# Patient Record
Sex: FEMALE | Race: WHITE | Employment: OTHER | URBAN - METROPOLITAN AREA
[De-identification: names, ages, dates, MRNs, and addresses within clinical notes are randomized per-mention and may not be internally consistent; named-entity substitution may affect disease eponyms.]

---

## 2018-02-26 ENCOUNTER — PAT TELEPHONE (OUTPATIENT)
Dept: PREADMISSION TESTING | Age: 73
End: 2018-02-26

## 2018-02-26 VITALS — BODY MASS INDEX: 27.47 KG/M2 | HEIGHT: 67 IN | WEIGHT: 175 LBS

## 2018-03-08 ENCOUNTER — HOSPITAL ENCOUNTER (OUTPATIENT)
Dept: SURGERY | Age: 73
Discharge: HOME | End: 2018-03-08
Attending: PODIATRIST | Admitting: PODIATRIST

## 2018-03-08 VITALS
TEMPERATURE: 97 F | OXYGEN SATURATION: 94 % | SYSTOLIC BLOOD PRESSURE: 114 MMHG | BODY MASS INDEX: 27.47 KG/M2 | WEIGHT: 175 LBS | DIASTOLIC BLOOD PRESSURE: 78 MMHG | RESPIRATION RATE: 16 BRPM | HEART RATE: 70 BPM | HEIGHT: 67 IN

## 2018-03-08 DIAGNOSIS — M21.612 BUNION, LEFT FOOT: Primary | ICD-10-CM

## 2018-03-08 RX ORDER — HYDROMORPHONE HCL 110MG/55ML
0.25 PATIENT CONTROLLED ANALGESIA SYRINGE INTRAVENOUS EVERY 5 MIN PRN
Status: DISCONTINUED | OUTPATIENT
Start: 2018-03-08 | End: 2018-03-09 | Stop reason: HOSPADM

## 2018-03-08 RX ORDER — PROMETHAZINE HYDROCHLORIDE 25 MG/1
25 TABLET ORAL EVERY 6 HOURS PRN
Qty: 30 TABLET | Refills: 2 | Status: SHIPPED | OUTPATIENT
Start: 2018-03-08 | End: 2019-03-08

## 2018-03-08 RX ORDER — SODIUM CHLORIDE, SODIUM LACTATE, POTASSIUM CHLORIDE, CALCIUM CHLORIDE 600; 310; 30; 20 MG/100ML; MG/100ML; MG/100ML; MG/100ML
INJECTION, SOLUTION INTRAVENOUS CONTINUOUS
Status: DISCONTINUED | OUTPATIENT
Start: 2018-03-08 | End: 2018-03-09 | Stop reason: HOSPADM

## 2018-03-08 RX ORDER — DIPHENHYDRAMINE HYDROCHLORIDE 50 MG/ML
12.5 INJECTION INTRAMUSCULAR; INTRAVENOUS
Status: ACTIVE | OUTPATIENT
Start: 2018-03-08 | End: 2018-03-08

## 2018-03-08 RX ORDER — OXYCODONE AND ACETAMINOPHEN 7.5; 325 MG/1; MG/1
1 TABLET ORAL EVERY 4 HOURS PRN
Qty: 40 TABLET | Refills: 0 | Status: SHIPPED | OUTPATIENT
Start: 2018-03-08 | End: 2018-03-15

## 2018-03-08 RX ORDER — LIDOCAINE HYDROCHLORIDE 10 MG/ML
1 INJECTION, SOLUTION EPIDURAL; INFILTRATION; INTRACAUDAL; PERINEURAL
Status: COMPLETED | OUTPATIENT
Start: 2018-03-08 | End: 2018-03-08

## 2018-03-08 RX ORDER — ONDANSETRON 2 MG/ML
4 INJECTION INTRAMUSCULAR; INTRAVENOUS
Status: ACTIVE | OUTPATIENT
Start: 2018-03-08 | End: 2018-03-08

## 2018-03-08 RX ORDER — SODIUM CHLORIDE 0.9 % (FLUSH) 0.9 %
10 SYRINGE (ML) INJECTION PRN
Status: DISCONTINUED | OUTPATIENT
Start: 2018-03-08 | End: 2018-03-09 | Stop reason: HOSPADM

## 2018-03-08 RX ORDER — MEPERIDINE HYDROCHLORIDE 25 MG/ML
12.5 INJECTION INTRAMUSCULAR; INTRAVENOUS; SUBCUTANEOUS EVERY 5 MIN PRN
Status: DISCONTINUED | OUTPATIENT
Start: 2018-03-08 | End: 2018-03-09 | Stop reason: HOSPADM

## 2018-03-08 RX ORDER — HYDROMORPHONE HCL 110MG/55ML
0.5 PATIENT CONTROLLED ANALGESIA SYRINGE INTRAVENOUS EVERY 5 MIN PRN
Status: COMPLETED | OUTPATIENT
Start: 2018-03-08 | End: 2018-03-08

## 2018-03-08 RX ORDER — PROMETHAZINE HYDROCHLORIDE 25 MG/ML
6.25 INJECTION, SOLUTION INTRAMUSCULAR; INTRAVENOUS
Status: ACTIVE | OUTPATIENT
Start: 2018-03-08 | End: 2018-03-08

## 2018-03-08 RX ORDER — HYDRALAZINE HYDROCHLORIDE 20 MG/ML
5 INJECTION INTRAMUSCULAR; INTRAVENOUS EVERY 10 MIN PRN
Status: DISCONTINUED | OUTPATIENT
Start: 2018-03-08 | End: 2018-03-09 | Stop reason: HOSPADM

## 2018-03-08 RX ORDER — ACETAMINOPHEN 10 MG/ML
1000 INJECTION, SOLUTION INTRAVENOUS ONCE
Status: COMPLETED | OUTPATIENT
Start: 2018-03-08 | End: 2018-03-08

## 2018-03-08 RX ORDER — OXYCODONE HYDROCHLORIDE AND ACETAMINOPHEN 5; 325 MG/1; MG/1
1 TABLET ORAL PRN
Status: COMPLETED | OUTPATIENT
Start: 2018-03-08 | End: 2018-03-08

## 2018-03-08 RX ORDER — OXYCODONE HYDROCHLORIDE AND ACETAMINOPHEN 5; 325 MG/1; MG/1
2 TABLET ORAL PRN
Status: COMPLETED | OUTPATIENT
Start: 2018-03-08 | End: 2018-03-08

## 2018-03-08 RX ORDER — MORPHINE SULFATE 10 MG/ML
2 INJECTION, SOLUTION INTRAMUSCULAR; INTRAVENOUS EVERY 5 MIN PRN
Status: DISCONTINUED | OUTPATIENT
Start: 2018-03-08 | End: 2018-03-09 | Stop reason: HOSPADM

## 2018-03-08 RX ORDER — MORPHINE SULFATE 10 MG/ML
1 INJECTION, SOLUTION INTRAMUSCULAR; INTRAVENOUS EVERY 5 MIN PRN
Status: DISCONTINUED | OUTPATIENT
Start: 2018-03-08 | End: 2018-03-09 | Stop reason: HOSPADM

## 2018-03-08 RX ORDER — LABETALOL HYDROCHLORIDE 5 MG/ML
5 INJECTION, SOLUTION INTRAVENOUS EVERY 10 MIN PRN
Status: DISCONTINUED | OUTPATIENT
Start: 2018-03-08 | End: 2018-03-09 | Stop reason: HOSPADM

## 2018-03-08 RX ORDER — 0.9 % SODIUM CHLORIDE 0.9 %
10 VIAL (ML) INJECTION EVERY 12 HOURS SCHEDULED
Status: DISCONTINUED | OUTPATIENT
Start: 2018-03-08 | End: 2018-03-09 | Stop reason: HOSPADM

## 2018-03-08 RX ADMIN — Medication 0.5 MG: at 10:59

## 2018-03-08 RX ADMIN — Medication 0.5 MG: at 11:23

## 2018-03-08 RX ADMIN — LIDOCAINE HYDROCHLORIDE 1 ML: 10 INJECTION, SOLUTION EPIDURAL; INFILTRATION; INTRACAUDAL; PERINEURAL at 07:47

## 2018-03-08 RX ADMIN — OXYCODONE HYDROCHLORIDE AND ACETAMINOPHEN 1 TABLET: 5; 325 TABLET ORAL at 12:03

## 2018-03-08 RX ADMIN — ACETAMINOPHEN 1000 MG: 10 INJECTION, SOLUTION INTRAVENOUS at 07:54

## 2018-03-08 RX ADMIN — SODIUM CHLORIDE, SODIUM LACTATE, POTASSIUM CHLORIDE, CALCIUM CHLORIDE: 600; 310; 30; 20 INJECTION, SOLUTION INTRAVENOUS at 07:47

## 2018-03-08 RX ADMIN — Medication 0.5 MG: at 11:16

## 2018-03-08 RX ADMIN — Medication 0.5 MG: at 11:09

## 2018-03-08 ASSESSMENT — ACTIVITIES OF DAILY LIVING (ADL): EFFECT OF PAIN ON DAILY ACTIVITIES: STANDING

## 2018-03-08 ASSESSMENT — PAIN DESCRIPTION - PROGRESSION: CLINICAL_PROGRESSION: NOT CHANGED

## 2018-03-08 ASSESSMENT — PAIN SCALES - GENERAL
PAINLEVEL_OUTOF10: 8
PAINLEVEL_OUTOF10: 4
PAINLEVEL_OUTOF10: 8
PAINLEVEL_OUTOF10: 4
PAINLEVEL_OUTOF10: 4
PAINLEVEL_OUTOF10: 7
PAINLEVEL_OUTOF10: 4
PAINLEVEL_OUTOF10: 8

## 2018-03-08 ASSESSMENT — PAIN DESCRIPTION - DESCRIPTORS: DESCRIPTORS: ACHING

## 2018-03-08 ASSESSMENT — PAIN DESCRIPTION - PAIN TYPE: TYPE: SURGICAL PAIN

## 2018-03-08 ASSESSMENT — PAIN - FUNCTIONAL ASSESSMENT: PAIN_FUNCTIONAL_ASSESSMENT: 0-10

## 2018-03-08 NOTE — ANESTHESIA PRE-OP
Department of Anesthesiology  Preprocedure Note       Name:  Keyanna Bae   Age:  72 y.o.  :  1945                                          MRN:  [id number]         Date:  3/8/2018      Surgeon:    Procedure:    Medications prior to admission:   Prior to Admission medications    Medication Sig Start Date End Date Taking? Authorizing Provider   gabapentin (NEURONTIN) 300 MG capsule Take 300 mg by mouth 2 times daily    Yes Historical Provider, MD   doxepin (SINEQUAN) 25 MG capsule Take 25 mg by mouth 10/9/13  Yes Historical Provider, MD   valsartan-hydrochlorothiazide (DIOVAN-HCT) 160-12.5 MG per tablet Take 1 tablet by mouth daily  10/13/14  Yes Historical Provider, MD   LORazepam (ATIVAN) 0.5 MG tablet Take 0.5 mg by mouth every 6 hours as needed for Anxiety   Yes Historical Provider, MD   Loratadine (CLARITIN PO) Take  by mouth. Yes Historical Provider, MD   aspirin 81 MG tablet Take 81 mg by mouth daily. Yes Historical Provider, MD   GABAPENTIN by Does not apply route. Yes Historical Provider, MD   Zolpidem Tartrate (AMBIEN PO) Take  by mouth. Yes Historical Provider, MD   traMADol (ULTRAM) 50 MG tablet Take 50 mg by mouth every 6 hours as needed. Yes Historical Provider, MD   NONFORMULARY every 30 days Indications: B 12 injection    Yes Historical Provider, MD       Current medications:    Current Outpatient Prescriptions   Medication Sig Dispense Refill    gabapentin (NEURONTIN) 300 MG capsule Take 300 mg by mouth 2 times daily       doxepin (SINEQUAN) 25 MG capsule Take 25 mg by mouth      valsartan-hydrochlorothiazide (DIOVAN-HCT) 160-12.5 MG per tablet Take 1 tablet by mouth daily       LORazepam (ATIVAN) 0.5 MG tablet Take 0.5 mg by mouth every 6 hours as needed for Anxiety      Loratadine (CLARITIN PO) Take  by mouth.  aspirin 81 MG tablet Take 81 mg by mouth daily.  GABAPENTIN by Does not apply route.  Zolpidem Tartrate (AMBIEN PO) Take  by mouth.       traMADol (ULTRAM) 50 MG tablet Take 50 mg by mouth every 6 hours as needed.       NONFORMULARY every 30 days Indications: B 12 injection        Current Facility-Administered Medications   Medication Dose Route Frequency Provider Last Rate Last Dose    lactated ringers infusion   Intravenous Continuous Christiane Daily,  mL/hr at 03/08/18 0747      sodium chloride (PF) 0.9 % injection 10 mL  10 mL Intravenous 2 times per day Christiane Daily, MD        sodium chloride flush 0.9 % injection 10 mL  10 mL Intravenous PRN Christiane Daily, MD        vancomycin 1000 mg IVPB in 250 mL D5W addavial  1,000 mg Intravenous Once Meryl [place] DPM        HYDROmorphone (DILAUDID) injection 0.25 mg  0.25 mg Intravenous Q5 Min PRN Christiane Daily, MD        HYDROmorphone (DILAUDID) injection 0.5 mg  0.5 mg Intravenous Q5 Min PRN Christiane Daily, MD        morphine (PF) injection 1 mg  1 mg Intravenous Q5 Min PRN Christiane Daily, MD        morphine (PF) injection 2 mg  2 mg Intravenous Q5 Min PRN Christiane Daily, MD        oxyCODONE-acetaminophen (PERCOCET) 5-325 MG per tablet 1 tablet  1 tablet Oral PRN Christiane Daily, MD        Or    oxyCODONE-acetaminophen (PERCOCET) 5-325 MG per tablet 2 tablet  2 tablet Oral PRN Christiane Daily, MD        diphenhydrAMINE (BENADRYL) injection 12.5 mg  12.5 mg Intravenous Once PRN Christiane Daily, MD        ondansetron [facility]) injection 4 mg  4 mg Intravenous Once PRN Christiane Daily, MD        promethazine [facility]) injection 6.25 mg  6.25 mg Intravenous Once PRN Christiane Daily, MD        labetalol (NORMODYNE;TRANDATE) injection 5 mg  5 mg Intravenous Q10 Min PRN Christiane Daily, MD        hydrALAZINE (APRESOLINE) injection 5 mg  5 mg Intravenous Q10 Min PRN Christiane Daily, MD        meperidine (DEMEROL) injection 12.5 mg  12.5 mg Intravenous Q5 Min PRN Christiane Daily, MD           Allergies:  No Known GI/Hepatic/Renal ROS  (+) GERD: well controlled,      (-) hiatal hernia       Endo/Other: Negative Endo/Other ROS   (+) : arthritis: OA., .                 Abdominal:           Vascular:                                     Pre-Operative Diagnosis: HALLUX ABDUCTO VALGUS LEFT FOOT,     72 y.o.   BMI:  Body mass index is 27.41 kg/m². Vitals:    03/08/18 0728   BP: 120/74   Pulse: 52   Resp: 16   Temp: 97.3 °F (36.3 °C)   TempSrc: Temporal   SpO2: 97%   Weight: 175 lb (79.4 kg)   Height: 5' 7\" (1.702 m)       No Known Allergies    Social History   Substance Use Topics    Smoking status: Never Smoker    Smokeless tobacco: Never Used    Alcohol use No       LABS:    CBC  Lab Results   Component Value Date/Time    WBC 3.9 (L) 01/14/2013 10:30 AM    HGB 10.8 (L) 01/14/2013 10:30 AM    HCT 33.0 (L) 01/14/2013 10:30 AM     01/14/2013 10:30 AM     RENAL  Lab Results   Component Value Date/Time     01/14/2013 10:30 AM    K 4.7 01/14/2013 10:30 AM     01/14/2013 10:30 AM    CO2 31 01/14/2013 10:30 AM    BUN 15 01/14/2013 10:30 AM    CREATININE 0.9 01/14/2013 10:30 AM    GLUCOSE 85 01/14/2013 10:30 AM     COAGS  Lab Results   Component Value Date/Time    PROTIME 11.7 12/17/2012 05:22 PM    INR 1.03 12/17/2012 05:22 PM        Anesthesia Plan      general     ASA 2     (I discussed with the patient the risks and benefits of PIV, general anesthesia, IV Narcotics, PACU. All questions were answered the patient agrees with the plan.)  Induction: intravenous. MIPS: Postoperative opioids intended and Prophylactic antiemetics administered. Anesthetic plan and risks discussed with patient. Plan discussed with CRNA.                   Meera Wheat MD   3/8/2018

## 2018-03-08 NOTE — ANESTHESIA POST-OP
Postoperative Anesthesia Note    Name:    Ann Charles  MRN:      [id number]    Patient Vitals for the past 12 hrs:   BP Temp Temp src Pulse Resp SpO2 Height Weight   03/08/18 1215 114/78 - - 70 16 94 % - -   03/08/18 1200 111/74 - - 69 14 92 % - -   03/08/18 1135 107/66 97 °F (36.1 °C) - 68 16 - - -   03/08/18 1124 109/66 - - 61 16 - - -   03/08/18 1109 117/71 - - 60 16 97 % - -   03/08/18 1053 124/77 - - 65 16 91 % - -   03/08/18 1048 125/74 - - 63 16 94 % - -   03/08/18 1043 134/84 - - 71 16 91 % - -   03/08/18 1038 125/82 97.3 °F (36.3 °C) Temporal 83 16 93 % - -   03/08/18 0728 120/74 97.3 °F (36.3 °C) Temporal 52 16 97 % 5' 7\" (1.702 m) 175 lb (79.4 kg)        LABS:    CBC  Lab Results   Component Value Date/Time    WBC 3.9 (L) 01/14/2013 10:30 AM    HGB 10.8 (L) 01/14/2013 10:30 AM    HCT 33.0 (L) 01/14/2013 10:30 AM     01/14/2013 10:30 AM     RENAL  Lab Results   Component Value Date/Time     01/14/2013 10:30 AM    K 4.7 01/14/2013 10:30 AM     01/14/2013 10:30 AM    CO2 31 01/14/2013 10:30 AM    BUN 15 01/14/2013 10:30 AM    CREATININE 0.9 01/14/2013 10:30 AM    GLUCOSE 85 01/14/2013 10:30 AM     COAGS  Lab Results   Component Value Date/Time    PROTIME 11.7 12/17/2012 05:22 PM    INR 1.03 12/17/2012 05:22 PM       Intake & Output: In: 3100 [I.V.:3100]  Out: -     Nausea & Vomiting:  No    Level of Consciousness:  Awake    Pain Assessment:  Adequate analgesia    Anesthesia Complications:  No apparent anesthetic complications    SUMMARY      Vital signs stable  OK to discharge from Stage I post anesthesia care.   Care transferred from Anesthesiology department on discharge from perioperative area

## 2018-03-08 NOTE — PROGRESS NOTES
Phase I (PACU)  1. Patient is identified using name and the date of birth. 2. The patient is free from signs and symptoms of chemical, electrical, laser, radiation, positioning, or transfer/transport injury. 3. The patient receives appropriate medication(s), safely administered during the Perioperative period. 4. The patient has wound/tissue perfusion consistent with or improved from baseline levels established preoperatively. 5. The patient is at or returning to normothermia at the conclusion of the immediate postoperative period. 6. The patient's fluid, electrolyte, and acid base balances are consistent with or improved from baseline levels established preoperatively. 7. The patient's pulmonary function is consistent with or improved from baseline levels established preoperatively. 8. The patient's cardiovascular status is consistent with or improved from baseline levels established preoperatively. 9. The patient/caregiver participates in decisions affecting his or her Perioperative plan of care. 10. The patient's care is consistent with the individualized Perioperative plan of care. 11. The patient's right to privacy is maintained. 12. The patient is the recipient of competent and ethical care within legal standards of practice. 13. The patient's value system, lifestyle, ethnicity, and culture are considered, respected, and incorporated in the Perioperative plan of care. 14. The patient demonstrates and/or reports adequate pain control throughout the the Perioperative period. 15. The patient's neurological status is consistent with or improved from baseline levels established preoperatively. 16. The patient/caregiver demonstrates knowledge of the expected responses to the operative or invasive procedure. 17. Patient/Caregiver has reduced anxiety. Interventions- Familiarize with environment and equipment.   18. Potential for fall the patient will move to fall risk upon sedation - through the
Phase II:  1. Patient is identified using name and the date of birth. 2. The patient is free from signs and symptoms of chemical, electrical, laser, radiation, positioning, or transfer/transport injury. 3. The patient receives appropriate medication(s), safely administered during the Perioperative period. 4. The patient has wound/tissue perfusion consistent with or improved from baseline levels established preoperatively. 5. The patient is at or returning to normothermia at the conclusion of the immediate postoperative period. 6. The patient's fluid, electrolyte, and acid base balances are consistent with or improved from baseline levels established preoperatively. 7. The patient's pulmonary function is consistent with or improved from baseline levels established preoperatively. 8. The patient's cardiovascular status is consistent with or improved from baseline levels established preoperatively. 9. The patient/caregiver demonstrates knowledge of nutritional management related to the operative or other invasive procedure. 10. The patient/caregiver demonstrates knowledge of medication, pain, and wound management. 11. The patient participates in the rehabilitation process as applicable. 12. The patient/caregiver participates in decisions affection his or her Perioperative plan of care. 13. The patient's care is consistent with the individualized Perioperative plan of care. 14. The patient's right to privacy is maintained. 15. The patient is the recipient of competent and ethical care within legal standards of practice. 16. The patient's value system, lifestyle, ethnicity, and culture are considered, respected, and incorporated in the Perioperative plan of care and understands special services available. 17. The patient demonstrates and/or reports adequate pain control throughout the the Perioperative period. 18.  The patient's neurological status is consistent with or improved from baseline levels
established preoperatively. 23. The patient/caregiver demonstrates knowledge of the expected responses to the operative or invasive procedure. 20. Patient/Caregiver has reduced anxiety. Interventions- Familiarize with environment and equipment. 21. Potential for fall the patient will move to fall risk upon sedation through the recovery phase. [place] to environment. Call light in reach. Instruct to call for assistance prior to getting up. Non skid footwear on. Bed wheels locked. Bed in lowest position. Side rails up times two.

## 2018-03-15 NOTE — OP NOTE
reduced. The lateral cortex  was left intact. This osteotomy was then fixated using a 10 mm staple. The hallux and the 1st ray were now noted to be in rectus position. The wound was then irrigated with copious amount of normal saline. The  periosteum and the MPJ capsule were reapproximated using 2-0 Vicryl sutures,  3-0 Vicryl sutures were used to reapproximate the subcutaneous tissue, 4-0  Vicryl sutures were used to reapproximate the skin edges in a continuous  subcuticular manner. Procedure #2: Arthroplasty of the 2nd digit, left foot. Attention was now  directed at the dorsal aspect of the 2nd digit over the PIPJ and the MPJ  area. Approximately 4-cm incision was placed over both of these joints. Careful sharp and dull dissection was carried down through the skin and  subcutaneous tissues. Next, a transverse incision was made over the PIPJ of  the 2nd digit reflecting the extensor tendon and the joint capsule. The head  of the proximal phalanx was then resected using the sagittal saw. The digit  was now noted to be relaxed at the PIPJ but was still noted to be dorsally  contracted at the MPJ. Sequential release was performed at the MPJ including  joint capsule and the extensor viera until the digit was noted to be sitting  in a rectus position. The digit was then fixated using a cross-tie toe implant. This wound was then  irrigated with copious amount of normal saline. The extensor tendon was  reapproximated using 3-0 Vicryl sutures, 3-0 Vicryl sutures were also used to  reapproximate the subcutaneous tissue, 4-0 Vicryl suture were used in a  continuous subcuticular manner to reapproximate the skin edges. Procedure #3: Arthroplasty of the 3rd digit, left foot. Attention was now  directed at the dorsal aspect of the 3rd digit over the PIPJ and the MPJ  area. Approximately 4-cm incision was placed over both of these joints.    Careful sharp and dull dissection was carried down through the skin a  continuous subcuticular manner to reapproximate the skin edges. Procedure #5: Arthroplasty of the 5th digit, left foot. Attention was now  directed at the dorsal aspect of the 5th digit over the PIPJ and the MPJ  area. Two semi-elliptical incisions were made over the digits. Careful sharp   and dull dissection was carried down through the skin and  subcutaneous tissues. Next, a transverse incision was made over the PIPJ of  the 5th digit reflecting the extensor tendon and the joint capsule. The head  of the proximal phalanx was then resected using the sagittal saw. The digit  was now noted to be relaxed at the [address] [place] [address]. This wound was then  irrigated with copious amount of normal saline. The extensor tendon was  reapproximated using 3-0 Vicryl sutures, 3-0 Vicryl sutures were also used to  reapproximate the subcutaneous tissue, 4-0 Vicryl suture were used in a  continuous subcuticular manner to reapproximate the skin edges. Procedure #6 : Weil osteotomy 2nd metatarsal left foot. Attention was then directed to the dorsal aspect of the left foot. Utilizing a #15 blade, a linear incision was made overlying the 2nd   metatarsophalangeal joint. This incision was made extending from the middle   phalanx proximally and across the proximal interphalangeal joint extending   along the dorsal aspect of the 2nd metatarsal. This incision was carried   down through superficial and deep structures to the level of the extensor   tendon. Utilizing a #15 blade extensor tenotomy was performed at the level   of the proximal interphalangeal joint and the extensor tendon was reflected   proximally, thereby exposing the underlying proximal phalanx and the 2nd   metatarsophalangeal joint. Utilizing a #15 blade a dorsal capsulotomy was   performed, as well as the medial and lateral collaterals were reflected   thereby exposing the underlying metatarsal head.     Procedure #7: The plantar plate was noted to be ruptured and was repaired using standard AO technique. At this time a metatarsal osteotomy was performed in the 2nd   metatarsal and the metatarsal head was shifted proximally and temporarily   fixated utilizing a 0.045 K-wire. Appropriate positioning of the metatarsal   head was obtained and utilizing a [employer] 2.0 mm twist-off screw the   metatarsal osteotomy was primary fixated and found to be in appropriate   position. The surgical site was then flushed with copious amounts of normal   sterile saline. At this point the joint capsule of the 2nd   metatarsophalangeal joint was repaired primarily utilizing a 3-0 Vicryl   suture, after which the tenotomy site was repaired primarily utilizing a 3-0   Vicryl suture. Deep and superficial structures were reapproximated utilizing   a 3-0 Vicryl suture, after which the skin edges were reapproximated utilizing   a 4-0 Vicryl suture in a running subcuticular suture fashion. Next, 30 mL of 0.5% Marcaine was injected into the operative sites for  postoperative anesthesia. Followed by application of compressive dressing  consisting of Adaptic, 4 x 4, Constantin. Followed by application of Webril,  posterior splint and Ace bandages so that the ankle could be held at 90  Degrees, by the time the dressing were applied the digits were noted to have CFTs less than 3 seconds. Patient tolerated anesthesia and procedure well and left the operating room  with vital signs stable and neurovascular status intact.

## 2018-11-27 ENCOUNTER — HOSPITAL ENCOUNTER (EMERGENCY)
Age: 73
Discharge: HOME | End: 2018-11-27
Attending: EMERGENCY MEDICINE
Payer: MEDICARE

## 2018-11-27 ENCOUNTER — APPOINTMENT (OUTPATIENT)
Dept: GENERAL RADIOLOGY | Age: 73
End: 2018-11-27
Payer: MEDICARE

## 2018-11-27 VITALS
HEIGHT: 67 IN | TEMPERATURE: 98.2 F | WEIGHT: 175 LBS | HEART RATE: 56 BPM | OXYGEN SATURATION: 96 % | SYSTOLIC BLOOD PRESSURE: 132 MMHG | DIASTOLIC BLOOD PRESSURE: 77 MMHG | BODY MASS INDEX: 27.47 KG/M2 | RESPIRATION RATE: 16 BRPM
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
## 2018-11-27 DIAGNOSIS — M25.561 ACUTE PAIN OF BOTH KNEES: ICD-10-CM

## 2018-11-27 DIAGNOSIS — M25.562 ACUTE PAIN OF BOTH KNEES: ICD-10-CM

## 2018-11-27 DIAGNOSIS — M79.642 LEFT HAND PAIN: ICD-10-CM

## 2018-11-27 PROCEDURE — 6370000000 HC RX 637 (ALT 250 FOR IP): Performed by: EMERGENCY MEDICINE

## 2018-11-27 PROCEDURE — 73130 X-RAY EXAM OF HAND: CPT

## 2018-11-27 PROCEDURE — 73560 X-RAY EXAM OF KNEE 1 OR 2: CPT

## 2018-11-27 PROCEDURE — 99284 EMERGENCY DEPT VISIT MOD MDM: CPT

## 2018-11-27 PROCEDURE — 72100 X-RAY EXAM L-S SPINE 2/3 VWS: CPT

## 2018-11-27 RX ORDER — NAPROXEN 500 MG/1
500 TABLET ORAL 2 TIMES DAILY PRN
Qty: 20 TABLET | Refills: 0 | Status: SHIPPED | OUTPATIENT
Start: 2018-11-27 | End: 2021-04-19 | Stop reason: ALTCHOICE

## 2018-11-27 RX ORDER — NAPROXEN 250 MG/1
500 TABLET ORAL ONCE
Status: COMPLETED | OUTPATIENT
Start: 2018-11-27 | End: 2018-11-27

## 2018-11-27 RX ADMIN — NAPROXEN 500 MG: 250 TABLET ORAL at 15:11

## 2018-11-27 ASSESSMENT — PAIN SCALES - GENERAL: PAINLEVEL_OUTOF10: 6

## 2019-06-05 LAB — ANTIBODY: NORMAL

## 2020-01-14 LAB — MAMMOGRAPHY, EXTERNAL: NORMAL

## 2020-11-24 ENCOUNTER — APPOINTMENT (OUTPATIENT)
Dept: CT IMAGING | Age: 75
End: 2020-11-24
Payer: MEDICARE

## 2020-11-24 ENCOUNTER — HOSPITAL ENCOUNTER (EMERGENCY)
Age: 75
Discharge: HOME | End: 2020-11-24
Payer: MEDICARE

## 2020-11-24 ENCOUNTER — APPOINTMENT (OUTPATIENT)
Dept: GENERAL RADIOLOGY | Age: 75
End: 2020-11-24
Payer: MEDICARE

## 2020-11-24 VITALS
DIASTOLIC BLOOD PRESSURE: 75 MMHG | HEART RATE: 55 BPM | WEIGHT: 175 LBS | RESPIRATION RATE: 16 BRPM | SYSTOLIC BLOOD PRESSURE: 132 MMHG | BODY MASS INDEX: 27.47 KG/M2 | TEMPERATURE: 97.9 F | OXYGEN SATURATION: 95 % | HEIGHT: 67 IN

## 2020-11-24 LAB
A/G RATIO: 1.5 (ref 1.1–2.2)
ALBUMIN SERPL-MCNC: 4.3 G/DL (ref 3.4–5)
ALP BLD-CCNC: 117 U/L (ref 40–129)
ALT SERPL-CCNC: 13 U/L (ref 10–40)
ANION GAP SERPL CALCULATED.3IONS-SCNC: 8 MMOL/L (ref 3–16)
AST SERPL-CCNC: 22 U/L (ref 15–37)
BASOPHILS ABSOLUTE: 0 K/UL (ref 0–0.2)
BASOPHILS RELATIVE PERCENT: 0.6 %
BILIRUB SERPL-MCNC: 0.5 MG/DL (ref 0–1)
BUN BLDV-MCNC: 15 MG/DL (ref 7–20)
C-REACTIVE PROTEIN: 2.1 MG/L (ref 0–5.1)
CALCIUM SERPL-MCNC: 8.8 MG/DL (ref 8.3–10.6)
CHLORIDE BLD-SCNC: 97 MMOL/L (ref 99–110)
CO2: 28 MMOL/L (ref 21–32)
CREAT SERPL-MCNC: 0.8 MG/DL (ref 0.6–1.2)
EOSINOPHILS ABSOLUTE: 0.1 K/UL (ref 0–0.6)
EOSINOPHILS RELATIVE PERCENT: 1.7 %
GFR AFRICAN AMERICAN: >60
GFR NON-AFRICAN AMERICAN: >60
GLOBULIN: 2.8 G/DL
GLUCOSE BLD-MCNC: 88 MG/DL (ref 70–99)
HCT VFR BLD CALC: 35.5 % (ref 36–48)
HEMOGLOBIN: 11.7 G/DL (ref 12–16)
LYMPHOCYTES ABSOLUTE: 1.3 K/UL (ref 1–5.1)
LYMPHOCYTES RELATIVE PERCENT: 17.5 %
MCH RBC QN AUTO: 27.8 PG (ref 26–34)
MCHC RBC AUTO-ENTMCNC: 32.8 G/DL (ref 31–36)
MCV RBC AUTO: 84.8 FL (ref 80–100)
MONOCYTES ABSOLUTE: 0.5 K/UL (ref 0–1.3)
MONOCYTES RELATIVE PERCENT: 7 %
NEUTROPHILS ABSOLUTE: 5.5 K/UL (ref 1.7–7.7)
NEUTROPHILS RELATIVE PERCENT: 73.2 %
PDW BLD-RTO: 14.7 % (ref 12.4–15.4)
PLATELET # BLD: 266 K/UL (ref 135–450)
PMV BLD AUTO: 7.1 FL (ref 5–10.5)
POTASSIUM REFLEX MAGNESIUM: 3.8 MMOL/L (ref 3.5–5.1)
RBC # BLD: 4.19 M/UL (ref 4–5.2)
SEDIMENTATION RATE, ERYTHROCYTE: 15 MM/HR (ref 0–30)
SODIUM BLD-SCNC: 133 MMOL/L (ref 136–145)
TOTAL PROTEIN: 7.1 G/DL (ref 6.4–8.2)
WBC # BLD: 7.5 K/UL (ref 4–11)

## 2020-11-24 PROCEDURE — 85652 RBC SED RATE AUTOMATED: CPT

## 2020-11-24 PROCEDURE — 86140 C-REACTIVE PROTEIN: CPT

## 2020-11-24 PROCEDURE — 99284 EMERGENCY DEPT VISIT MOD MDM: CPT

## 2020-11-24 PROCEDURE — 85025 COMPLETE CBC W/AUTO DIFF WBC: CPT

## 2020-11-24 PROCEDURE — 6370000000 HC RX 637 (ALT 250 FOR IP): Performed by: PHYSICIAN ASSISTANT

## 2020-11-24 PROCEDURE — 73502 X-RAY EXAM HIP UNI 2-3 VIEWS: CPT

## 2020-11-24 PROCEDURE — 80053 COMPREHEN METABOLIC PANEL: CPT

## 2020-11-24 PROCEDURE — 73700 CT LOWER EXTREMITY W/O DYE: CPT

## 2020-11-24 RX ORDER — HYDROCODONE BITARTRATE AND ACETAMINOPHEN 5; 325 MG/1; MG/1
1 TABLET ORAL ONCE
Status: DISCONTINUED | OUTPATIENT
Start: 2020-11-24 | End: 2020-11-24

## 2020-11-24 RX ORDER — ACETAMINOPHEN 500 MG
500 TABLET ORAL ONCE
Status: COMPLETED | OUTPATIENT
Start: 2020-11-24 | End: 2020-11-24

## 2020-11-24 RX ADMIN — ACETAMINOPHEN 500 MG: 500 TABLET ORAL at 12:29

## 2020-11-24 ASSESSMENT — ENCOUNTER SYMPTOMS
RESPIRATORY NEGATIVE: 1
GASTROINTESTINAL NEGATIVE: 1

## 2020-11-24 ASSESSMENT — PAIN DESCRIPTION - DESCRIPTORS: DESCRIPTORS: BURNING;CONSTANT;STABBING

## 2020-11-24 ASSESSMENT — PAIN SCALES - GENERAL: PAINLEVEL_OUTOF10: 10

## 2020-11-24 ASSESSMENT — PAIN DESCRIPTION - ORIENTATION: ORIENTATION: RIGHT

## 2020-11-24 ASSESSMENT — PAIN DESCRIPTION - PAIN TYPE: TYPE: ACUTE PAIN

## 2020-11-24 ASSESSMENT — PAIN DESCRIPTION - LOCATION: LOCATION: HIP

## 2020-11-24 NOTE — ED PROVIDER NOTES
[address] ED  EMERGENCY DEPARTMENT ENCOUNTER        Pt Name: Lisa Carolina  MRN: [id number]  [place] 1945  Date of evaluation: 11/24/2020  Provider: Rosaline Osler, PA-C  PCP: Santino Bobo MD    MEGHANN. I have evaluated this patient. My supervising physician was available for consultation. CHIEF COMPLAINT       Chief Complaint   Patient presents with    Hip Pain     right after fall Friday night       HISTORY OF PRESENT ILLNESS   (Location, Timing/Onset, Context/Setting, Quality, Duration, Modifying Factors, Severity, Associated Signs and Symptoms)  Note limiting factors. Lisa Carolina is a 70 y.o. female with a past medical history of hypertension, brain aneurysm, arthritis brought in today by private vehicle for complaints of right hip pain. Patient states she tripped on a sidewalk 3 days ago and landed on her right hip. Denies hitting her head or loss of consciousness. Onset occurred 3 days ago. Duration symptoms have been persistent since onset. Context includes a mechanical fall. She has been able to bear weight since the fall. She has been taking tramadol as well as muscle relaxants at home with little to no relief of the pain. Patient did drive in today. She currently rates the pain a 10 out of 10. Denies any numbness or tingling to lower extremities. Denies any bowel or bladder and incontinence. Denies fevers chills nausea vomiting or abdominal pain. Denies any urinary complaints. No aggravating or alleviating complaints. She has not tried anything else at home for symptomatic relief. Nothing seems to make symptoms better or worse at this time. Nursing Notes were all reviewed and agreed with or any disagreements were addressed in the HPI. REVIEW OF SYSTEMS    (2-9 systems for level 4, 10 or more for level 5)     Review of Systems   Constitutional: Negative. Respiratory: Negative. Cardiovascular: Negative. Gastrointestinal: Negative. Genitourinary: Negative. Musculoskeletal: Positive for arthralgias. Skin: Negative. Neurological: Negative. Positives and Pertinent negatives as per HPI. Except as noted above in the ROS, all other systems were reviewed and negative. PAST MEDICAL HISTORY     Past Medical History:   Diagnosis Date    Arthritis     Brain aneurysm     Hypertension     Neuropathy     PONV (postoperative nausea and vomiting)     after colonoscopy         SURGICAL HISTORY     Past Surgical History:   Procedure Laterality Date    BRAIN ANEURYSM SURGERY      BUNIONECTOMY Left 03/08/2018    COLONOSCOPY  2013    JOINT REPLACEMENT      UPPER GASTROINTESTINAL ENDOSCOPY  28 Dec 2015    polyps bx         CURRENTMEDICATIONS       Current Discharge Medication List      CONTINUE these medications which have NOT CHANGED    Details   LOSARTAN POTASSIUM PO Take by mouth      naproxen (NAPROXEN) 500 MG EC tablet Take 1 tablet by mouth 2 times daily as needed for Pain  Qty: 20 tablet, Refills: 0      gabapentin (NEURONTIN) 300 MG capsule Take 300 mg by mouth 2 times daily       doxepin (SINEQUAN) 25 MG capsule Take 25 mg by mouth      LORazepam (ATIVAN) 0.5 MG tablet Take 0.5 mg by mouth every 6 hours as needed for Anxiety      Loratadine (CLARITIN PO) Take  by mouth. aspirin 81 MG tablet Take 81 mg by mouth daily. GABAPENTIN by Does not apply route. Zolpidem Tartrate (AMBIEN PO) Take  by mouth. traMADol (ULTRAM) 50 MG tablet Take 50 mg by mouth every 6 hours as needed. NONFORMULARY every 30 days Indications: B 12 injection                ALLERGIES     Patient has no known allergies. FAMILYHISTORY     History reviewed. No pertinent family history.        SOCIAL HISTORY       Social History     Tobacco Use    Smoking status: Never Smoker    Smokeless tobacco: Never Used   Substance Use Topics    Alcohol use: No    Drug use: No       SCREENINGS    Brenda Coma Scale  Eye Opening: Pronounced osteolysis and periosteal reaction of both the right and left   pubic symphysis. Small associated joint effusion. Differential   considerations include osteomyelitis as well as pronounced osteitis pubis. Correlate clinically for signs of infection as imaging appearance of osteitis   pubis and osteomyelitis can overlap. The aggressive appearance is suspicious   for infection in the appropriate clinical setting. Chronic appearing insufficiency fracture of the left sacrum. No acute fracture of the right hip identified. XR HIP RIGHT (2-3 VIEWS)   Final Result   No acute abnormality of the right hip. Bony resorption about the symphysis   pubis presumed to be degenerative in nature. Osteomyelitis could cause a   similar appearance. No other significant abnormality. RECOMMENDATION:   Correlation with any symptoms referable to the symphysis pubis. If the   patient is significantly symptomatic, CT or MR scanning would be recommended   for further evaluation. Xr Hip Right (2-3 Views)    Result Date: 11/24/2020  EXAMINATION: TWO XRAY VIEWS OF THE RIGHT HIP 11/24/2020 12:54 pm COMPARISON: Lumbar spine films 11/27/2018. HISTORY: ORDERING SYSTEM PROVIDED HISTORY: fall right hip pain TECHNOLOGIST PROVIDED HISTORY: Reason for exam:->fall right hip pain Reason for Exam: rt hip pain Acuity: Chronic Type of Exam: Initial Mechanism of Injury: tripped over curb in parking lot Relevant Medical/Surgical History: hx of left hip pain/abnormal mri per pt FINDINGS: No acute fracture or dislocation is seen in the right hip. No significant degenerative change present in either hip. Significant degenerative changes and bony resorption present at the symphysis pubis. These findings have changed since 11/27/2018. Soft tissues appear normal.     No acute abnormality of the right hip. Bony resorption about the symphysis pubis presumed to be degenerative in nature.   Osteomyelitis could cause a similar appearance. No other significant abnormality. RECOMMENDATION: Correlation with any symptoms referable to the symphysis pubis. If the patient is significantly symptomatic, CT or MR scanning would be recommended for further evaluation. Ct Hip Right Wo Contrast    Result Date: 11/24/2020  EXAMINATION: CT OF THE RIGHT HIP WITHOUT CONTRAST 11/24/2020 1:47 pm TECHNIQUE: CT of the right hip was performed without the administration of intravenous contrast.  Multiplanar reformatted images are provided for review. Dose modulation, iterative reconstruction, and/or weight based adjustment of the mA/kV was utilized to reduce the radiation dose to as low as reasonably achievable. COMPARISON: None. HISTORY ORDERING SYSTEM PROVIDED HISTORY: bony reabsorption about the symphysis pubis TECHNOLOGIST PROVIDED HISTORY: Reason for exam:->bony reabsorption about the symphysis pubis Reason for Exam: right after fall Friday night Acuity: Chronic Type of Exam: Initial FINDINGS: Bones: Bony resorption and associated fragmentation of both the right and left pubic symphysis. Vertically oriented sclerosis and cortical irregularity of the left sacral body most compatible with sacral insufficiency fracture which is favored to be chronic. The osseous structures are otherwise intact with no acute fracture of the right hip identified. Soft Tissue:  Visualized intrapelvic structures demonstrate no acute abnormality. At least mild sigmoid diverticulosis. Imaged bowel loops are normal in caliber with no evidence for obstruction. Subcutaneous tissues appear grossly unremarkable. Joint:  Osteolysis and periosteal reaction involving the pubic symphysis with small associated joint effusion. Anatomic alignment of the hips. The joint spaces of the hips appear grossly preserved. Mild-to-moderate degenerative changes of the imaged lower lumbar spine.      Pronounced osteolysis and periosteal reaction of both the right and left pubic symphysis. Small associated joint effusion. Differential considerations include osteomyelitis as well as pronounced osteitis pubis. Correlate clinically for signs of infection as imaging appearance of osteitis pubis and osteomyelitis can overlap. The aggressive appearance is suspicious for infection in the appropriate clinical setting. Chronic appearing insufficiency fracture of the left sacrum. No acute fracture of the right hip identified. PROCEDURES   Unless otherwise noted below, none     Procedures    CRITICAL CARE TIME   N/A    CONSULTS:  IP CONSULT TO ORTHOPEDIC SURGERY      EMERGENCY DEPARTMENT COURSE and DIFFERENTIAL DIAGNOSIS/MDM:   Vitals:    Vitals:    11/24/20 1236 11/24/20 1307 11/24/20 1337 11/24/20 1654   BP: 139/69 125/76 (!) 124/52 132/75   Pulse:   56 55   Resp:   16 16   Temp:       TempSrc:       SpO2: 93% 93% 93% 95%   Weight:       Height:           Patient was given the following medications:  Medications   acetaminophen (TYLENOL) tablet 500 mg (500 mg Oral Given 11/24/20 1229)           Patient brought in today by private vehicle for complaints of right hip pain after mechanical fall. On exam patient is alert oriented afebrile breathing on room air satting at 95%. Nontoxic in appearance no acute respiratory distress. Old labs and records reviewed at this time. CBC reveals no acute leukocytosis. Hemoglobin of 11.7. No acute electrolyte abnormalities. Kidney function unremarkable. X-ray of the right hip reveals no acute abnormality. Bony reabsorption about the symphysis pubis presumed to be degenerative in nature. Osteomyelitis could cause a similar appearance. No other significant abnormality. Correlate with any symptoms referable to the symphysis pubis. If the patient is symptomatic a CT or MR would be recommended. CT of the right hip reveals pronounced osteolysis and periosteal reaction of both the right and left pubic symphysis.   Small associated joint effusion. Differential includes osteomyelitis as well as pronounced osteitis pubis. Correlate clinically for signs of infection as imaging appearance of osteitis pubis and osteomyelitis can overlap. The aggressive appearance is suspicious for infection in the appropriate clinical setting. Chronic appearing insufficiency fracture of the left sacrum. No acute fracture of the right hip is identified. Given finding on CT I did consult orthopedics and spoke to Dr. Thomas Finch who did not feel as though this was infectious in nature. ESR was unremarkable. I did discuss findings with the patient. CRP was pending. Patient does follow with orthopedics at [place]. Patient already has tramadol and muscle relaxants at home. I did give patient follow-up for orthopedics however patient would like to follow-up with her [place] orthopedics as she is already established. I advised her to follow-up in the next 1 to 2 days. Patient instructed to return immediately to the ED if she experience any new or worsening symptoms including but not limited to increased pain, numbness or tingling fevers chills nausea vomiting bowel or bladder incontinence or any new or worsening symptoms. She verbalized understanding this plan was comfortable and stable at time of discharge. Patient able to bear weight at time of discharge. I did feel comfortable sending this patient home with close follow-up instructions and strict return precautions. Patient stable at time of discharge. FINAL IMPRESSION      1.  Right hip pain          DISPOSITION/PLAN   DISPOSITION Decision To Discharge 11/24/2020 04:17:48 PM      PATIENT REFERREDTO:  Keith Sinclair MD  [address]  [phone]    Schedule an appointment as soon as possible for a visit in 1 day      [facility] [employer] [facility] ED  [address] [facility]  Schedule an appointment as soon as possible for a visit   As needed, If symptoms worsen      DISCHARGE MEDICATIONS:  Current Discharge Medication List          DISCONTINUED MEDICATIONS:  Current Discharge Medication List                 (Please note that portions of this note were completed with a voice recognition program.  Efforts were made to edit the dictations but occasionally words are mis-transcribed.)    Rosaline Osler, PA-C (electronically signed)            Rosaline Osler, PA-C  11/24/20 3459

## 2020-11-30 ENCOUNTER — OFFICE VISIT (OUTPATIENT)
Dept: ORTHOPEDIC SURGERY | Age: 75
End: 2020-11-30
Payer: MEDICARE

## 2020-11-30 VITALS — BODY MASS INDEX: 27.47 KG/M2 | HEIGHT: 67 IN | WEIGHT: 175 LBS

## 2020-11-30 PROBLEM — M53.3 SACROILIAC JOINT PAIN: Status: ACTIVE | Noted: 2020-11-30

## 2020-11-30 PROCEDURE — 1090F PRES/ABSN URINE INCON ASSESS: CPT | Performed by: ORTHOPAEDIC SURGERY

## 2020-11-30 PROCEDURE — 1123F ACP DISCUSS/DSCN MKR DOCD: CPT | Performed by: ORTHOPAEDIC SURGERY

## 2020-11-30 PROCEDURE — G8427 DOCREV CUR MEDS BY ELIG CLIN: HCPCS | Performed by: ORTHOPAEDIC SURGERY

## 2020-11-30 PROCEDURE — 3017F COLORECTAL CA SCREEN DOC REV: CPT | Performed by: ORTHOPAEDIC SURGERY

## 2020-11-30 PROCEDURE — 1036F TOBACCO NON-USER: CPT | Performed by: ORTHOPAEDIC SURGERY

## 2020-11-30 PROCEDURE — G8484 FLU IMMUNIZE NO ADMIN: HCPCS | Performed by: ORTHOPAEDIC SURGERY

## 2020-11-30 PROCEDURE — G8417 CALC BMI ABV UP PARAM F/U: HCPCS | Performed by: ORTHOPAEDIC SURGERY

## 2020-11-30 PROCEDURE — G8400 PT W/DXA NO RESULTS DOC: HCPCS | Performed by: ORTHOPAEDIC SURGERY

## 2020-11-30 PROCEDURE — 4040F PNEUMOC VAC/ADMIN/RCVD: CPT | Performed by: ORTHOPAEDIC SURGERY

## 2020-11-30 PROCEDURE — 99203 OFFICE O/P NEW LOW 30 MIN: CPT | Performed by: ORTHOPAEDIC SURGERY

## 2020-11-30 NOTE — PROGRESS NOTES
Chief Complaint    New Patient (RIGHT HIP - PSIS down posterior glute)      History of Present Illness: Author  is a 70 y.o. female for evaluation of chief complaint right lower back and posterior buttock pain. This started approximately 10 days ago when she fell onto her right side. She is initially evaluated in the emergency department where there is concern for potential for septic joint or hip fracture. Inflammatory labs were negative as were radiograph and CT. 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 The symptoms do fit similar to that. .  Symptoms are worse with activity and better with rest. Patient endorses right low back and buttock pain as above but denies radicular symptoms down into her leg. Treatment to date includes: Anti-inflammatories, antispasmodics, use of assistive device in the form of a cane. Medical History:  Patient's medications, allergies, past medical, surgical, social and family histories were reviewed and updated as appropriate. Review of Systems:  Relevant review of systems reviewed and available in the patient's chart. They are negative or noncontributory except as per HPI. Vital Signs: There were no vitals filed for this visit. General: well-developed, well-nourished; adequately groomed  CV: RR  RESP: Respirations unlabored on RA  Skin: No rashes or ulcerations appreciated  Psych: appropriate mood and affect      Musculoskeletal:    Extremity: Right lower extremity    Inspection: No swelling or ecchymosis about the leg distally. Palpation: No tenderness palpation about the greater trochanter. Range of Motion: Full hip range of motion. Stability: Pelvis is stable    Strength: 5 out of 5 hip flexion, knee extension, knee flexion, dorsiflexion plantarflexion inversion eversion about the right ankle.     Special Tests: Positive KALIN test for posterior pain, negative straight leg test.    Gait: Antalgic    Pulse/perfusion: 2+ dorsalis pedis pulse, foot warm and well perfused    Neurological:    Sensation: Sensation intact to light touch throughout     Reflexes: Deep tendon reflexes intact     Functional: the patient has good coordination and balance     Radiology:     X-rays obtained and reviewed in office:  Views AP pelvis and right hip  Impression there are degenerative changes to bilateral SI joints and demonstrated a history of prior fracture and degenerative changes about the symphysis pubis. No evidence of hip fracture. 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 Her current symptoms of the same so I suspect that that is the case although without an MRI would not have clear approved. This could also be from her degenerative arthritis of her lumbar spine. Impression:  Encounter Diagnoses   Name Primary?  Right hip pain Yes    Sacroiliac joint pain        Office Procedures:  Orders Placed This Encounter   Procedures    XR HIP RIGHT (2-3 VIEWS)     Standing Status:   Future     Number of Occurrences:   1     Standing Expiration Date:   11/30/2021   Tita Ohara MD, Spine Surgery, [facility]     Referral Priority:   Routine     Referral Type:   Eval and Treat     Referral Reason:   Specialty Services Required     Referred to Provider:   Laura Moreno MD     Requested Specialty:   Pain Management     Number of Visits Requested:   1       Treatment Plan:      I had a nice discussion with the patient today. I would not get another MRI as I do not think that this would change my management. She does think she got a DEXA scan at some point but I would strongly recommend she follow-up with her primary care doctor, with whom she has an appointment on Wednesday, for follow-up with those results and also for further discussion about vitamin D limitation which her primary care doctor has tried to get her to do before.   Her pain is significantly improving and so I just think she should continue to walk on this and use her cane and see how it goes. I will refer to one of my partners Dr. Aparna Solis for determination if she is appropriate for an injection. In the meantime I do think that she should take the tizanidine she already has as needed for muscle spasms and eventually therapy would really help her with this but she cannot quite tolerate it yet. If she is having any issues following up with Dr. Abigail Monae, if her pain worsens or changes, I would certainly like her to call me and I am happy to see her back and help further direct her care. She may be weightbearing as tolerated and should do the therapy which she has been previously prescribed when she is able to tolerate it. Otherwise she may follow-up with me on an as-needed basis.

## 2020-12-08 ENCOUNTER — OFFICE VISIT (OUTPATIENT)
Dept: ORTHOPEDIC SURGERY | Age: 75
End: 2020-12-08
Payer: MEDICARE

## 2020-12-08 VITALS — BODY MASS INDEX: 27.15 KG/M2 | RESPIRATION RATE: 12 BRPM | TEMPERATURE: 97 F | HEIGHT: 67 IN | WEIGHT: 173 LBS

## 2020-12-08 PROCEDURE — 1036F TOBACCO NON-USER: CPT | Performed by: PHYSICIAN ASSISTANT

## 2020-12-08 PROCEDURE — G8400 PT W/DXA NO RESULTS DOC: HCPCS | Performed by: PHYSICIAN ASSISTANT

## 2020-12-08 PROCEDURE — 1123F ACP DISCUSS/DSCN MKR DOCD: CPT | Performed by: PHYSICIAN ASSISTANT

## 2020-12-08 PROCEDURE — 99204 OFFICE O/P NEW MOD 45 MIN: CPT | Performed by: PHYSICIAN ASSISTANT

## 2020-12-08 PROCEDURE — G8484 FLU IMMUNIZE NO ADMIN: HCPCS | Performed by: PHYSICIAN ASSISTANT

## 2020-12-08 PROCEDURE — 3017F COLORECTAL CA SCREEN DOC REV: CPT | Performed by: PHYSICIAN ASSISTANT

## 2020-12-08 PROCEDURE — G8417 CALC BMI ABV UP PARAM F/U: HCPCS | Performed by: PHYSICIAN ASSISTANT

## 2020-12-08 PROCEDURE — 1090F PRES/ABSN URINE INCON ASSESS: CPT | Performed by: PHYSICIAN ASSISTANT

## 2020-12-08 PROCEDURE — G8427 DOCREV CUR MEDS BY ELIG CLIN: HCPCS | Performed by: PHYSICIAN ASSISTANT

## 2020-12-08 PROCEDURE — 4040F PNEUMOC VAC/ADMIN/RCVD: CPT | Performed by: PHYSICIAN ASSISTANT

## 2020-12-08 RX ORDER — CELECOXIB 200 MG/1
200 CAPSULE ORAL DAILY
COMMUNITY
Start: 2020-09-04

## 2020-12-08 RX ORDER — TIZANIDINE 4 MG/1
4 TABLET ORAL EVERY 6 HOURS PRN
COMMUNITY
Start: 2020-10-21

## 2020-12-08 RX ORDER — MELOXICAM 15 MG/1
TABLET ORAL
COMMUNITY
Start: 2020-11-16 | End: 2021-04-19 | Stop reason: ALTCHOICE

## 2020-12-08 RX ORDER — HYDROCODONE BITARTRATE AND ACETAMINOPHEN 5; 325 MG/1; MG/1
1 TABLET ORAL 2 TIMES DAILY
Qty: 14 TABLET | Refills: 0 | Status: SHIPPED | OUTPATIENT
Start: 2020-12-08 | End: 2020-12-15

## 2020-12-08 NOTE — PROGRESS NOTES
New Patient: SPINE    Referring Provider:  Jeanie Stiles MD    Chief Complaint   Patient presents with    Lower Back Pain     NP, LSP    Hip Pain     Right hip pain    Leg Pain     Right thigh pain       HISTORY OF PRESENT ILLNESS:      · The patient is being sent at the request of Jeanie Stiles MD in consultation as a new spine patient for low back pain and right leg pain. The patient is a 70 y.o. female whom reports symptoms for 3 weeks. Symptoms progressed over the last  3 weeks. Patient reports there was a significant event to cause the symptoms. Today discomfort is report at 10 out of 10, describing it as sharp, throbbing, stabbing. Symptoms are aggravated by: Activity which includes walking, standing, climbing stairs, and lying down. Patient has undergone recent treatment including, anti-inflammatories, muscle relaxers, pain medication (Tramadol) and the use of a cane. Patient denies previous lumbar spine surgery. · The patient presents today as a new patient with lower back and radiating right hip and posterior gluteal pain. The patient describes that her pain started approximately 3 weeks ago when she fell onto her right side. She was initially seen and evaluated in the emergency department for concern of a potential septic joint or hip fracture. The inflammatory labs were negative as were radiograph and CT of the right hip. 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 She describes that her symptoms are worse with activity and improved with resting. The patient states that her right lower back and gluteal pain persists with radicular symptoms down the posterior and lateral aspect of the right thigh almost to the knee, this is worse with walking even from her bedroom to the kitchen. The patient has tried ice and heat, which help moderately but they do not remove the pain completely. The patient was seen by Dr. Mauro Oro on the 11/30/2020.   The patient has been seen by a chiropractor over the summer, this was for treatment of the right hip at the time. She has not been seen since that time for the new pain following the fall. She did attend three visits, she was in more pain when she left the office. The patient does ambulate with a quad cane today in the office. Pain Assessment  Location of Pain: Back(LSP)  Location Modifiers: Right, Posterior(Hip/Thigh)  Severity of Pain: 10  Quality of Pain: Throbbing, Sharp, Other (Comment)(Stabbing)  Duration of Pain: Persistent  Frequency of Pain: Constant  Aggravating Factors: Walking, Standing, Stairs, Other (Comment)(Lying down)  Limiting Behavior: Yes  Relieving Factors: Rest, Nsaids  Result of Injury: No  Work-Related Injury: No  Are there other pain locations you wish to document?: No      Associated signs and symptoms:   Neurogenic bowel or bladder symptoms:  no   Perceived weakness:  yes   Difficulty walking:  yes    Recent Imaging (within past one year)   Xrays: yes   MRI or CT of spine: no    Current/Past Treatment:   · Physical Therapy:  none  · Chiropractic:  yes  · Injection:  none  · Medications:   NSAIDS:  yes , Mobic and Celebrex   Muscle relaxer:  yes , Zanaflex   Steriods:  none   Neuropathic medications:  Gabapentin   Opioids:  Tramadol   · Previous surgery:  no  · Previous surgical consult:  no  · Other:  · Infection control  · Tested positive for MRSA in past 12 months:  no  · Tested positive for MSSA \"staph infection\" in past 12 months: no  · Tested positive for VRE (Vancomycin Resistant Enterococci) in past 12 months:   no  · Currently on any antibiotics for an infection: no  · Anticoagulants:  · On a blood thinner:  yes , ASA  · Any history of bleeding disorder: no   · MRI Contraindication: no   · Previous Pain Management: no       Past medical, surgical, social and family history reviewed with the patient.      Past Medical History:   Past Medical History:   Diagnosis Date    Arthritis     Brain aneurysm  Hypertension     Neuropathy     PONV (postoperative nausea and vomiting)     after colonoscopy      Past Surgical History:     Past Surgical History:   Procedure Laterality Date    BRAIN ANEURYSM SURGERY      BUNIONECTOMY Left 03/08/2018    COLONOSCOPY  2013    JOINT REPLACEMENT      UPPER GASTROINTESTINAL ENDOSCOPY  28 Dec 2015    polyps bx     Current Medications:     Current Outpatient Medications:     celecoxib (CELEBREX) 200 MG capsule, Take 200 mg by mouth daily, Disp: , Rfl:     meloxicam (MOBIC) 15 MG tablet, TAKE 1 TABLET BY MOUTH ONCE DAILY POST SURGERY, Disp: , Rfl:     tiZANidine (ZANAFLEX) 4 MG tablet, , Disp: , Rfl:     LOSARTAN POTASSIUM PO, Take by mouth, Disp: , Rfl:     naproxen (NAPROXEN) 500 MG EC tablet, Take 1 tablet by mouth 2 times daily as needed for Pain, Disp: 20 tablet, Rfl: 0    gabapentin (NEURONTIN) 300 MG capsule, Take 300 mg by mouth 2 times daily , Disp: , Rfl:     doxepin (SINEQUAN) 25 MG capsule, Take 25 mg by mouth, Disp: , Rfl:     LORazepam (ATIVAN) 0.5 MG tablet, Take 0.5 mg by mouth every 6 hours as needed for Anxiety, Disp: , Rfl:     Loratadine (CLARITIN PO), Take  by mouth., Disp: , Rfl:     aspirin 81 MG tablet, Take 81 mg by mouth daily. , Disp: , Rfl:     Zolpidem Tartrate (AMBIEN PO), Take  by mouth., Disp: , Rfl:     NONFORMULARY, every 30 days Indications: B 12 injection , Disp: , Rfl:   Allergies:  Patient has no known allergies. Social History:    reports that she has never smoked. She has never used smokeless tobacco. She reports that she does not drink alcohol or use drugs. Family History:   Family History   Problem Relation Age of Onset    Cancer Mother     Osteoporosis Mother     Osteoarthritis Mother     Cancer Father          REVIEW OF SYSTEMS: ROS - 14 point    Constitutional: No fevers, chills, night sweats, unexplained weight loss  Eye: No vision changes or diplopia  ENT: No nasal congestion, postnasal drip or sore throat.  No tinnitus  Respiratory: No cough or SOB  CV: No chest pain or palpitations, + lower extremity swelling   GI: No nausea, abdominal pain, stool changes, + dyspepsia   : No dysuria or hematuria, + frequent urination   Skin: No new or changing skin lesions, no rashes  MSK: No joint swelling, morning stiffness, unusual joint pain, + lower back and right hip pain and right leg pain. Neurological: No headache, confusion, syncope  Psychiatric: No excessive anxiety or depression  Endocrine: No polyuria or polydipsia  Hematologic: No lymph node enlargement or excessive bleeding  Immunologic:No history of immune deficiency or immunomodulating drugs           PHYSICAL EXAM:    Vitals: Temperature 97 °F (36.1 °C), resp. rate 12, height 5' 7\" (1.702 m), weight 173 lb (78.5 kg). GENERAL EXAM:  · General Apparence: Patient is adequately groomed with no evidence of malnutrition. · Psychiatric: Orientation: The patient is oriented to time, place and person. The patient's mood and affect are appropriate   · Vascular: Examination reveals no swelling and palpation reveals no tenderness in upper or lower extremities. Good capillary refill. · The lymphatic examination of the neck, axillae and groin reveals all areas to be without enlargement or induration. · Sensation is intact without deficit in the upper and lower extremities to light touch. · Coordination of the upper and lower extremities are normal.    CERVICAL EXAMINATION:  · Inspection: Local inspection shows no step-off or bruising. Cervical alignment is normal. No instability is noted. · Palpation and Percussion: No evidence of tenderness at the midline. Paraspinal tenderness is not present. There is no paraspinal spasm. · Range of Motion:  limited by 25% in all planes due to pain   · Strength: 5/5 bilateral upper extremities  · Special Tests:   Spurling's and Duarte's are negative bilaterally.     Winters and Impingement tests are negative bilaterally. · Skin:There are no rashes, ulcerations or lesions. · Reflexes: Bilaterally triceps, biceps and brachioradialis are 2+. Clonus absent bilaterally at the feet. No pathological reflexes are noted. · Gait & station:  antalgic on the right and no ataxia  · Additional Examinations:  · RIGHT UPPER EXTREMITY:  Inspection/examination of the right upper extremity does not show any tenderness, deformity or injury. Range of motion is normal and pain-free. There is no gross instability. There are no rashes, ulcerations or lesions. Strength and tone are normal. No atrophy or abnormal movements are noted. · LEFT UPPER EXTREMITY: Inspection/examination of the left upper extremity does not show any tenderness, deformity or injury. Range of motion is normal and pain-free. There is no gross instability. There are no rashes, ulcerations or lesions. Strength and tone are normal. No atrophy or abnormal movements are noted. LUMBAR/SACRAL EXAMINATION:  · Inspection: Local inspection shows no step-off or bruising. Lumbar alignment is normal. No instability is noted. · Palpation:   No evidence of tenderness at the midline. Lumbar paraspinal tenderness Mild L4/5 and L5/S1 tenderness with moderate to severe right SI joint tenderness. Bursal tenderness No tenderness bilaterally  There is no paraspinal spasm. · Range of Motion: painful with facet loading with extension and rotation to the left with pain on the right. · Strength:   Strength testing is 5/5 in all muscle groups tested. · Special Tests:   Straight leg raise positive right and negative left and crossed SLR negative. Vincenzo's testing is positive right and negative left. FADIR's testing is negative bilaterally. + Right SI joint compression testing. · Skin: There are no rashes, ulcerations or lesions. · Reflexes: Reflexes are symmetrically 1+ at the patellar and ankle tendons. Clonus absent bilaterally at the feet.   · Gait & station: antalgic on the right and no ataxia  · Additional Examinations:  · RIGHT LOWER EXTREMITY: Inspection/examination of the right lower extremity does not show any tenderness, deformity or injury. Range of motion is unremarkable. There is no gross instability. There are no rashes, ulcerations or lesions. Strength and tone are normal. No atrophy or abnormal movements are noted. · LEFT LOWER EXTREMITY:  Inspection/examination of the left lower extremity does not show any tenderness, deformity or injury. Range of motion is unremarkable. There is no gross instability. There are no rashes, ulcerations or lesions. Strength and tone are normal. No atrophy or abnormal movements are noted. Diagnostic Testing:    Xrays:   X-rays of the Lumbar Spine completed on 7/31/20   5 lumbar vertebral bodies. L5-S1 degenerative vacuum disc space narrowing. L5-S1 facet arthropathy    Lordotic curvature is well-maintained. No spondylolisthesis    No evidence of fracture    Paraspinal soft tissues are normal      IMPRESSION:  1. L5-S1 degenerative vacuum vertebral disc space narrowing, unchanged from prior study. MRI or CT:  MRI of the Pelvis without contrast from 10/27/20:     FINDINGS: There is abnormal edema identified within the bilateral pubic bones. There are erosive changes identified along the inner margins of the pubic bones. There is fluid identified within the pubic symphysis. There is edema identified within the adjacent hip adductor musculature at its origin upon the pubis. There is a nondisplaced incomplete fracture extending through the superior aspect of the left pubic bone. There is a nondisplaced fracture extending through the left sacral ala  with surrounding marrow edema. No other pelvic fractures are identified. Remainder of the marrow signal is normal.    Bilateral hip joints are normal.    No muscular edema is identified. No tendon tear or tendinopathy is identified. Patient is status post a hysterectomy.  No alignment of the hips.  The joint    spaces of the hips appear grossly preserved.  Mild-to-moderate degenerative    changes of the imaged lower lumbar spine.              Impression    Pronounced osteolysis and periosteal reaction of both the right and left    pubic symphysis.  Small associated joint effusion.  Differential    considerations include osteomyelitis as well as pronounced osteitis pubis. Correlate clinically for signs of infection as imaging appearance of osteitis    pubis and osteomyelitis can overlap.  The aggressive appearance is suspicious    for infection in the appropriate clinical setting.         Chronic appearing insufficiency fracture of the left sacrum.         No acute fracture of the right hip identified.         EMG:  None  Results for orders placed or performed during the hospital encounter of 11/24/20   CBC auto differential   Result Value Ref Range    WBC 7.5 4.0 - 11.0 K/uL    RBC 4.19 4.00 - 5.20 M/uL    Hemoglobin 11.7 (L) 12.0 - 16.0 g/dL    Hematocrit 35.5 (L) 36.0 - 48.0 %    MCV 84.8 80.0 - 100.0 fL    MCH 27.8 26.0 - 34.0 pg    MCHC 32.8 31.0 - 36.0 g/dL    RDW 14.7 12.4 - 15.4 %    Platelets 873 736 - 568 K/uL    MPV 7.1 5.0 - 10.5 fL    Neutrophils % 73.2 %    Lymphocytes % 17.5 %    Monocytes % 7.0 %    Eosinophils % 1.7 %    Basophils % 0.6 %    Neutrophils Absolute 5.5 1.7 - 7.7 K/uL    Lymphocytes Absolute 1.3 1.0 - 5.1 K/uL    Monocytes Absolute 0.5 0.0 - 1.3 K/uL    Eosinophils Absolute 0.1 0.0 - 0.6 K/uL    Basophils Absolute 0.0 0.0 - 0.2 K/uL   Comprehensive Metabolic Panel w/ Reflex to MG   Result Value Ref Range    Sodium 133 (L) 136 - 145 mmol/L    Potassium reflex Magnesium 3.8 3.5 - 5.1 mmol/L    Chloride 97 (L) 99 - 110 mmol/L    CO2 28 21 - 32 mmol/L    Anion Gap 8 3 - 16    Glucose 88 70 - 99 mg/dL    BUN 15 7 - 20 mg/dL    CREATININE 0.8 0.6 - 1.2 mg/dL    GFR Non-African American >60 >60    GFR African American >60 >60    Calcium 8.8 8.3 - 10.6 mg/dL Total Protein 7.1 6.4 - 8.2 g/dL    Alb 4.3 3.4 - 5.0 g/dL    Albumin/Globulin Ratio 1.5 1.1 - 2.2    Total Bilirubin 0.5 0.0 - 1.0 mg/dL    Alkaline Phosphatase 117 40 - 129 U/L    ALT 13 10 - 40 U/L    AST 22 15 - 37 U/L    Globulin 2.8 g/dL   Sedimentation Rate   Result Value Ref Range    Sed Rate 15 0 - 30 mm/Hr   C-reactive protein   Result Value Ref Range    CRP 2.1 0.0 - 5.1 mg/L       Impression (Medical Decision Making):       1. Sacroiliac joint dysfunction of right side    2. Lumbar radiculopathy    3. DDD (degenerative disc disease), lumbar    4. Spondylosis without myelopathy or radiculopathy, lumbar region        Plan (Medical Decision Making):    I discussed the diagnosis and the treatment options with Yara Castorena today. In Summary:  The various treatment options were outlined and discussed with Yara Castorena including:  Conservative care options: physical therapy, ice, medications, bracing, and activity modification. The indications for therapeutic injections. The indications for additional imaging/laboratory studies. The indications for (possible future) interventions. After considering the various options discussed, Yara Castorena elected to pursue a course of treatment that includes the followin. Medications: OARRS reviewed and appropriate. Low risk on ORT. Begin Norco 5-325 mg 1 po BID #14. The patient does take Tramadol however this is not helping with her pain at this time, so we will try a stronger prescription for one week. Informed verbal consent was obtained. Goals of the current treatment regimen include: improvement in pain, improvement in overall in physical performance, ability to perform daily activities, work or disability, emotional distress, health care utilization and decreased medication consumption. Progress will be monitored towards achieving/maintaining therapeutic goals:    1.  Improving perceived interference of pain with ADL's, ability to perform HEP, continue to improve in overall flexibility, ROM, strength and endurance, ability to do household activities indoor and/or outdoor, work and social/leisure activities. Improve psychosocial and physical functioning. 2. Improving sleep to 6-7 hours a night. Improve mood/ anxiety and depression symptoms    3. Reduction of reliance on opioid analgesia/more appropriate opioid use. Utilization of adjuvant medications as appropriate. Risks and benefits of the medications and alternative treatments have been discussed with the patient. The patient was advised against drinking alcohol with the opioid pain medicines, advised against driving or handling machinery when starting or adjusting the dose of medicines, feeling groggy or drowsy, or if having any cognitive issues related to the current medications. The patient is fully aware of the risk of overdose and death, if medicines are misused and not taken as prescribed. If the patient develops new symptoms or if the symptoms worsen, the patient was told to call the office. No flowsheet data found. 2. PT:  No PT at this time. 3. Further studies: Setup Pelvis and Lumbar MR without contrast to evaluate for soft tissue pathology or stenosis contributing to the back pain and paresthesia. 4. Interventional:  After further imaging is obtained, interventional options will be reviewed and recommended.     5. Healthy Lifestyle Measures:  Patient education material reviewing the following was distributed to Author   Anatomic drawings  Healthy lifestyle education  Osteoporosis prevention,   Back and neck pain educational information   Advanced imaging preparedness    Posture education   Proper lifting and carrying techniques,   Weight management  Quitting smoking and   Minor ways to treat back pain  For further information regarding the spine conditions and to review interventional treatments the patient was directed to [employer].    6.  Follow up: 1-2 weeks    Silva Agarwal was instructed to call the office if her symptoms worsen or if new symptoms appear prior to the next scheduled visit. She is specifically instructed to contact the office between now & her scheduled appointment if she has concerns related to her condition or if she needs assistance in scheduling the above tests. She is welcome to call for an appointment sooner if she has any additional concerns or questions. RICH Wells, PA-C  Board Certified by the [employer] on Certification of Physician Assistants  Jane Swanson 60  Partner of [facility] ([facility])       This dictation was performed with a verbal recognition program [facility]) and it was checked for errors. It is possible that there are still dictated errors within this office note. If so, please bring any errors to my attention for an addendum. All efforts were made to ensure that this office note is accurate.

## 2020-12-11 ENCOUNTER — HOSPITAL ENCOUNTER (OUTPATIENT)
Dept: MRI IMAGING | Age: 75
Discharge: HOME | End: 2020-12-11
Payer: MEDICARE

## 2020-12-11 PROCEDURE — 72148 MRI LUMBAR SPINE W/O DYE: CPT

## 2020-12-11 PROCEDURE — 72195 MRI PELVIS W/O DYE: CPT

## 2020-12-15 ENCOUNTER — OFFICE VISIT (OUTPATIENT)
Dept: ORTHOPEDIC SURGERY | Age: 75
End: 2020-12-15
Payer: MEDICARE

## 2020-12-15 VITALS — TEMPERATURE: 96.6 F | RESPIRATION RATE: 12 BRPM | WEIGHT: 173 LBS | BODY MASS INDEX: 27.15 KG/M2 | HEIGHT: 67 IN

## 2020-12-15 PROCEDURE — G8417 CALC BMI ABV UP PARAM F/U: HCPCS | Performed by: PHYSICIAN ASSISTANT

## 2020-12-15 PROCEDURE — 1036F TOBACCO NON-USER: CPT | Performed by: PHYSICIAN ASSISTANT

## 2020-12-15 PROCEDURE — 4040F PNEUMOC VAC/ADMIN/RCVD: CPT | Performed by: PHYSICIAN ASSISTANT

## 2020-12-15 PROCEDURE — G8484 FLU IMMUNIZE NO ADMIN: HCPCS | Performed by: PHYSICIAN ASSISTANT

## 2020-12-15 PROCEDURE — G8400 PT W/DXA NO RESULTS DOC: HCPCS | Performed by: PHYSICIAN ASSISTANT

## 2020-12-15 PROCEDURE — 1090F PRES/ABSN URINE INCON ASSESS: CPT | Performed by: PHYSICIAN ASSISTANT

## 2020-12-15 PROCEDURE — 99214 OFFICE O/P EST MOD 30 MIN: CPT | Performed by: PHYSICIAN ASSISTANT

## 2020-12-15 PROCEDURE — G8427 DOCREV CUR MEDS BY ELIG CLIN: HCPCS | Performed by: PHYSICIAN ASSISTANT

## 2020-12-15 PROCEDURE — 1123F ACP DISCUSS/DSCN MKR DOCD: CPT | Performed by: PHYSICIAN ASSISTANT

## 2020-12-15 PROCEDURE — 3017F COLORECTAL CA SCREEN DOC REV: CPT | Performed by: PHYSICIAN ASSISTANT

## 2020-12-15 RX ORDER — HYDROCODONE BITARTRATE AND ACETAMINOPHEN 5; 325 MG/1; MG/1
1 TABLET ORAL 2 TIMES DAILY
Qty: 14 TABLET | Refills: 0 | Status: SHIPPED | OUTPATIENT
Start: 2020-12-16 | End: 2020-12-23

## 2020-12-15 NOTE — LETTER
[address] [place] [address]  [id number]  Phone: [phone]  Fax: [address] [place], [place]        December 15, 2020     Patient: Baljeet Choi   YOB: 1945   Date of Visit: 12/15/2020       To Whom It May Concern: It is my medical opinion that Patrizia Fink requires a disability parking placard for the following reasons:  She cannot walk 200 feet without stopping to rest.  Duration of need: 1 year    If you have any questions or concerns, please don't hesitate to call.     Sincerely,          RICH Vargas, PA-C  Board Certified by the [employer] on Certification of Physician Assistants  [id number] [facility]  Partner of [facility] ([facility])

## 2020-12-15 NOTE — PROGRESS NOTES
Follow up: Eri Silvestre  1945  [id number]         Chief Complaint   Patient presents with    Lower Back Pain     TR MRI LSP & MRI Pelvis 12/11/20         HISTORY OF PRESENT ILLNESS:  Ms. Dinesh Grullon is a 70 y.o. female returns for a follow up visit for multiple medical problems. Her current presenting problems are   1. Right hip pain    2. Sacral insufficiency fracture, initial encounter    3. Sacroiliac joint dysfunction of right side    4. DDD (degenerative disc disease), lumbar    5. Spondylosis without myelopathy or radiculopathy, lumbar region    . As per information/history obtained from the PADT(patient assessment and documentation tool) - She complains of pain in the lower back with radiation to the buttocks and hips Right She rates the pain 8/10 and describes it as aching, throbbing. Pain is made worse by: movement, walking. She denies side effects from the current pain regimen. Patient reports that since the last follow-up visit the physical functioning is worse, family/social relationships are worse, mood is worse and sleep patterns are worse, and that overall functioning is worse. Patient denies neurological bowel or bladder. Patient presents today in follow-up after an MRI of the lumbar spine as well as an MRI of the pelvis were completed. The patient describes that the majority of her pain continues to be in the right side of the lower back with right hip pain as well as right gluteal pain. The patient describes that her pain is worse with walking and movement she is switched to using a walker instead of a cane as it helps with her balance more. She describes that sitting and lying down and improve her pain. The patient is requesting handicap placard today in the office as she is having a lot of trouble walking for a great distance. The patient states that the pain medication that was provided to her at her last office visit has been very helpful for her and she is requesting a refill today. Associated signs and symptoms:   Neurogenic bowel or bladder symptoms:  no   Perceived weakness:  no   Difficulty walking:  yes            Past medical, surgical, social and family history reviewed with the patient. Past Medical History:   Past Medical History:   Diagnosis Date    Arthritis     Brain aneurysm     Hypertension     Neuropathy     PONV (postoperative nausea and vomiting)     after colonoscopy      Past Surgical History:     Past Surgical History:   Procedure Laterality Date    BRAIN ANEURYSM SURGERY      BUNIONECTOMY Left 03/08/2018    COLONOSCOPY  2013    JOINT REPLACEMENT      UPPER GASTROINTESTINAL ENDOSCOPY  28 Dec 2015    polyps bx     Current Medications:     Current Outpatient Medications:     celecoxib (CELEBREX) 200 MG capsule, Take 200 mg by mouth daily, Disp: , Rfl:     meloxicam (MOBIC) 15 MG tablet, TAKE 1 TABLET BY MOUTH ONCE DAILY POST SURGERY, Disp: , Rfl:     tiZANidine (ZANAFLEX) 4 MG tablet, , Disp: , Rfl:     HYDROcodone-acetaminophen (NORCO) 5-325 MG per tablet, Take 1 tablet by mouth 2 times daily for 7 days. , Disp: 14 tablet, Rfl: 0   LOSARTAN POTASSIUM PO, Take by mouth, Disp: , Rfl:     naproxen (NAPROXEN) 500 MG EC tablet, Take 1 tablet by mouth 2 times daily as needed for Pain, Disp: 20 tablet, Rfl: 0    gabapentin (NEURONTIN) 300 MG capsule, Take 300 mg by mouth 2 times daily , Disp: , Rfl:     doxepin (SINEQUAN) 25 MG capsule, Take 25 mg by mouth, Disp: , Rfl:     LORazepam (ATIVAN) 0.5 MG tablet, Take 0.5 mg by mouth every 6 hours as needed for Anxiety, Disp: , Rfl:     Loratadine (CLARITIN PO), Take  by mouth., Disp: , Rfl:     aspirin 81 MG tablet, Take 81 mg by mouth daily. , Disp: , Rfl:     Zolpidem Tartrate (AMBIEN PO), Take  by mouth., Disp: , Rfl:     NONFORMULARY, every 30 days Indications: B 12 injection , Disp: , Rfl:   Allergies:  Patient has no known allergies. Social History:    reports that she has never smoked. She has never used smokeless tobacco. She reports that she does not drink alcohol or use drugs. Family History:   Family History   Problem Relation Age of Onset    Cancer Mother     Osteoporosis Mother     Osteoarthritis Mother     Cancer Father        REVIEW OF SYSTEMS:   CONSTITUTIONAL: Denies unexplained weight loss, fevers, chills or fatigue  NEUROLOGICAL: Denies unsteady gait or progressive weakness  MUSCULOSKELETAL: Denies joint swelling or redness  GI: Denies nausea, vomiting, diarrhea   : Denies bowel or bladder issues       PHYSICAL EXAM:    Vitals: Temperature 96.6 °F (35.9 °C), resp. rate 12, height 5' 7\" (1.702 m), weight 173 lb (78.5 kg). GENERAL EXAM:  · General Apparence: Patient is adequately groomed with no evidence of malnutrition. · Psychiatric: Orientation: The patient is oriented to time, place and person. The patient's mood and affect are appropriate   · Vascular: Examination reveals no swelling and palpation reveals no tenderness in upper or lower extremities. Good capillary refill. · RIGHT LOWER EXTREMITY: Inspection/examination of the right lower extremity does not show any tenderness, deformity or injury. Range of motion is normal and pain-free. There is no gross instability. There are no rashes, ulcerations or lesions. Strength and tone are normal. No atrophy or abnormal movements are noted. · LEFT LOWER EXTREMITY:  Inspection/examination of the left lower extremity does not show any tenderness, deformity or injury. Range of motion is normal and pain-free. There is no gross instability. There are no rashes, ulcerations or lesions. Strength and tone are normal. No atrophy or abnormal movements are noted. Diagnostic Testing:    MR Lumbar spine shows  from 12/11/20:  FINDINGS:   BONES/ALIGNMENT: There is normal alignment of the spine. The vertebral body   heights are maintained. The bone marrow signal appears unremarkable.       SPINAL CORD: The conus terminates normally.       SOFT TISSUES: No paraspinal mass identified.       L1-L2: There is no significant disc herniation, spinal canal stenosis or   neural foraminal narrowing.       L2-L3: Moderate central and lateral trefoil type spinal stenosis due to   hypertrophic facet disease.       L3-L4: There is no significant disc herniation, spinal canal stenosis or   neural foraminal narrowing.  Hypertrophic facet disease.       L4-L5: There is no significant disc herniation, spinal canal stenosis. Hypertrophic facet disease and bilateral neural foraminal narrowing.       L5-S1: There is no significant disc herniation, spinal canal stenosis. Hypertrophic facet disease and bilateral neural foraminal narrowing.       T1 and T2 lengthening with in the sacral ala bilaterally incompletely imaged   at the edge of the field of view.           Impression   Bone marrow edema within the sacral ala.       Multilevel central and lateral spinal stenosis most severe at L2-3.      MRI of the Pelvis from 12/11/20:  FINDINGS: Please see the separate report from the concurrent MRI lumbar spine for   details regarding the visualized lower lumbar spine.       SOFT TISSUES: The visualized musculature is unremarkable.  The bilateral   hamstring, iliopsoas, gluteal, and proximal adductor tendons are intact.  The   bilateral sciatic nerves are unremarkable.  No abnormal soft tissue mass or   fluid collection.  Status post hysterectomy.       BONE MARROW: There are acute or subacute nondisplaced fractures of bilateral   sacral alae.  No displaced fracture or dislocation.  No suspicious marrow   space-occupying lesion.       JOINTS: The bilateral sacroiliac joints and hips are unremarkable.  Chronic   sclerosis and erosive changes of the bilateral pubic bones with mild widening   of the pubic symphysis.         Impression   1. Acute or subacute nondisplaced fractures of the bilateral sacral alae. 2. Chronic appearing sclerosis and erosions of the bilateral pubic bones. This may represent severe chronic osteitis pubis or osteomyelitis.        Results for orders placed or performed during the hospital encounter of 11/24/20   CBC auto differential   Result Value Ref Range    WBC 7.5 4.0 - 11.0 K/uL    RBC 4.19 4.00 - 5.20 M/uL    Hemoglobin 11.7 (L) 12.0 - 16.0 g/dL    Hematocrit 35.5 (L) 36.0 - 48.0 %    MCV 84.8 80.0 - 100.0 fL    MCH 27.8 26.0 - 34.0 pg    MCHC 32.8 31.0 - 36.0 g/dL    RDW 14.7 12.4 - 15.4 %    Platelets 344 960 - 533 K/uL    MPV 7.1 5.0 - 10.5 fL    Neutrophils % 73.2 %    Lymphocytes % 17.5 %    Monocytes % 7.0 %    Eosinophils % 1.7 %    Basophils % 0.6 %    Neutrophils Absolute 5.5 1.7 - 7.7 K/uL    Lymphocytes Absolute 1.3 1.0 - 5.1 K/uL    Monocytes Absolute 0.5 0.0 - 1.3 K/uL    Eosinophils Absolute 0.1 0.0 - 0.6 K/uL    Basophils Absolute 0.0 0.0 - 0.2 K/uL   Comprehensive Metabolic Panel w/ Reflex to MG   Result Value Ref Range    Sodium 133 (L) 136 - 145 mmol/L    Potassium reflex Magnesium 3.8 3.5 - 5.1 mmol/L Chloride 97 (L) 99 - 110 mmol/L    CO2 28 21 - 32 mmol/L    Anion Gap 8 3 - 16    Glucose 88 70 - 99 mg/dL    BUN 15 7 - 20 mg/dL    CREATININE 0.8 0.6 - 1.2 mg/dL    GFR Non-African American >60 >60    GFR African American >60 >60    Calcium 8.8 8.3 - 10.6 mg/dL    Total Protein 7.1 6.4 - 8.2 g/dL    Alb 4.3 3.4 - 5.0 g/dL    Albumin/Globulin Ratio 1.5 1.1 - 2.2    Total Bilirubin 0.5 0.0 - 1.0 mg/dL    Alkaline Phosphatase 117 40 - 129 U/L    ALT 13 10 - 40 U/L    AST 22 15 - 37 U/L    Globulin 2.8 g/dL   Sedimentation Rate   Result Value Ref Range    Sed Rate 15 0 - 30 mm/Hr   C-reactive protein   Result Value Ref Range    CRP 2.1 0.0 - 5.1 mg/L     Impression:       1. Right hip pain    2. Sacral insufficiency fracture, initial encounter    3. Sacroiliac joint dysfunction of right side    4. DDD (degenerative disc disease), lumbar    5. Spondylosis without myelopathy or radiculopathy, lumbar region        Plan:  Clinical Course: shows no change    I discussed the diagnosis and the treatment options with Lakshmi Duarte today. In Summary:  The various treatment options were outlined and discussed with Lakshmi Duarte including:  Conservative care options: physical therapy, ice, medications, bracing, and activity modification. The indications for therapeutic injections. The indications for additional imaging/laboratory studies. The indications for (possible future) interventions. After considering the various options discussed, Lakshmi Duarte elected to pursue a course of treatment that includes the followin. Medications:  OARRS reviewed and appropriate. Low risk on ORT. Begin Norco 5-325 mg 1 po BID #14. Earliest fill date on 20. Informed verbal consent was obtained. Goals of the current treatment regimen include: improvement in pain, improvement in overall in physical performance, ability to perform daily activities, work or disability, emotional distress, health care utilization and decreased medication consumption. Progress will be monitored towards achieving/maintaining therapeutic goals:    1. Improving perceived interference of pain with ADL's, ability to perform HEP, continue to improve in overall flexibility, ROM, strength and endurance, ability to do household activities indoor and/or outdoor, work and social/leisure activities. Improve psychosocial and physical functioning. 2. Improving sleep to 6-7 hours a night. Improve mood/ anxiety and depression symptoms    3. Reduction of reliance on opioid analgesia/more appropriate opioid use. Utilization of adjuvant medications as appropriate. Risks and benefits of the medications and alternative treatments have been discussed with the patient. The patient was advised against drinking alcohol with the opioid pain medicines, advised against driving or handling machinery when starting or adjusting the dose of medicines, feeling groggy or drowsy, or if having any cognitive issues related to the current medications. The patient is fully aware of the risk of overdose and death, if medicines are misused and not taken as prescribed. If the patient develops new symptoms or if the symptoms worsen, the patient was told to call the office. No flowsheet data found. 2. PT:  Encouraged to continue with Home exercise program.    3. Further studies: No further studies. 4. Interventional:  The patient will be referred to Dr. Estefany Wallace for evaluation of the right hip. We could consider a Right SI Joint Injection as a possible option for her pain, will hold at this time due to the sacral fractures.      5. Follow up:  4-6 weeks Ivana Paige was instructed to call the office if her symptoms worsen or if new symptoms appear prior to the next scheduled visit. She is specifically instructed to contact the office between now & her scheduled appointment if she has concerns related to her condition or if she needs assistance in scheduling the above tests. She is welcome to call for an appointment sooner if she has any additional concerns or questions. RICH Woodall, FIDE  Board Certified by the [employer] on Certification of Physician Assistants  [address] [place] [address]  Partner of [facility] ([facility])         This dictation was performed with a verbal recognition program [facility] ) and it was checked for errors. It is possible that there are still dictated errors within this office note. If so, please bring any errors to my attention for an addendum. All efforts were made to ensure that this office note is accurate.

## 2020-12-21 ENCOUNTER — OFFICE VISIT (OUTPATIENT)
Dept: ORTHOPEDIC SURGERY | Age: 75
End: 2020-12-21
Payer: MEDICARE

## 2020-12-21 VITALS — HEIGHT: 67 IN | WEIGHT: 173.06 LBS | BODY MASS INDEX: 27.16 KG/M2

## 2020-12-21 LAB
BASOPHILS ABSOLUTE: 0 10*3/UL (ref 0–0.2)
BASOPHILS RELATIVE PERCENT: 0.8 %
C-REACTIVE PROTEIN WIDE RANGE: 0.8 MG/L (ref 0–5)
EOSINOPHILS ABSOLUTE: 0.1 10*3/UL (ref 0–0.5)
EOSINOPHILS RELATIVE PERCENT: 2.2 %
ERYTHROCYTE SEDIMENTATION RATE: 10 MM/HR (ref 0–30)
GRANULOCYTE ABSOLUTE COUNT: 2.9 10*3/UL (ref 1.5–7.8)
HCT VFR BLD CALC: 37.2 % (ref 35–45)
HEMOGLOBIN: 12.2 G/DL (ref 11.7–15.5)
LYMPHOCYTES ABSOLUTE: 1.5 10*3/UL (ref 0.8–3.9)
LYMPHOCYTES RELATIVE PERCENT: 30.4 %
MCH RBC QN AUTO: 27.7 PG (ref 27–33)
MCHC RBC AUTO-ENTMCNC: 32.8 G/DL (ref 32–36)
MCV RBC AUTO: 84.3 FL (ref 80–100)
MONOCYTES ABSOLUTE: 0.4 10*3/UL (ref 0.2–0.9)
MONOCYTES RELATIVE PERCENT: 7.8 %
PDW BLD-RTO: 15 % (ref 11–15)
PLATELET # BLD: 324 10*3/UL (ref 140–400)
PMV BLD AUTO: 7.9 FL (ref 7.5–11.5)
RBC # BLD: 4.41 10*6/UL (ref 3.8–5.1)
SEGMENTED NEUTROPHILS RELATIVE PERCENT: 58.8 %
WBC # BLD: 4.9 10*3/UL (ref 3.8–10.8)

## 2020-12-21 PROCEDURE — 1123F ACP DISCUSS/DSCN MKR DOCD: CPT | Performed by: PHYSICIAN ASSISTANT

## 2020-12-21 PROCEDURE — 3017F COLORECTAL CA SCREEN DOC REV: CPT | Performed by: PHYSICIAN ASSISTANT

## 2020-12-21 PROCEDURE — 1090F PRES/ABSN URINE INCON ASSESS: CPT | Performed by: PHYSICIAN ASSISTANT

## 2020-12-21 PROCEDURE — 99213 OFFICE O/P EST LOW 20 MIN: CPT | Performed by: PHYSICIAN ASSISTANT

## 2020-12-21 PROCEDURE — G8400 PT W/DXA NO RESULTS DOC: HCPCS | Performed by: PHYSICIAN ASSISTANT

## 2020-12-21 PROCEDURE — 4040F PNEUMOC VAC/ADMIN/RCVD: CPT | Performed by: PHYSICIAN ASSISTANT

## 2020-12-21 PROCEDURE — G8417 CALC BMI ABV UP PARAM F/U: HCPCS | Performed by: PHYSICIAN ASSISTANT

## 2020-12-21 PROCEDURE — G8484 FLU IMMUNIZE NO ADMIN: HCPCS | Performed by: PHYSICIAN ASSISTANT

## 2020-12-21 PROCEDURE — G8427 DOCREV CUR MEDS BY ELIG CLIN: HCPCS | Performed by: PHYSICIAN ASSISTANT

## 2020-12-21 PROCEDURE — 1036F TOBACCO NON-USER: CPT | Performed by: PHYSICIAN ASSISTANT

## 2020-12-21 RX ORDER — PREDNISONE 10 MG/1
TABLET ORAL
Qty: 35 TABLET | Refills: 1 | Status: SHIPPED | OUTPATIENT
Start: 2020-12-21 | End: 2021-04-19 | Stop reason: ALTCHOICE

## 2020-12-21 RX ORDER — METHYLPREDNISOLONE 4 MG/1
TABLET ORAL
Qty: 1 KIT | Refills: 0 | Status: SHIPPED | OUTPATIENT
Start: 2020-12-21 | End: 2021-04-19 | Stop reason: ALTCHOICE

## 2020-12-21 NOTE — PROGRESS NOTES
Chief Complaint    New Patient (right hip, 6 weeks ago had a fall and landed on hip, pain gradually worsened. Pain starts in lower back and goes into leg)      History of Present Illness: Brittani Avila is a 70 y.o. female who comes in today for evaluation and treatment of \"right hip pain \". She is referred in today by Dawn Steiner PA-C. Patient reports fall that she had approximately 4 weeks ago. She reports that it was a fall that she sustained landing on her right side the Friday before Thanksgiving. Since then she has been complaining of quite a bit of pain in her right lower lumbar area in her right gluteal region. This would occasionally radiate to her right lateral thigh. She denies any groin pain. She has been recently seen Dr. Patricia Renae office since the fall where x-rays and MRIs have been performed. MRIs were performed of the lumbar spine as well as the pelvis. The reports of the MRIs do indicate that she does have bilateral nondisplaced fractures of the sacral olena with chronic appearing sclerosis and erosions of the bilateral pubic symphysis. The MRI suggests the representation of either chronic osteitis pubis or osteomyelitis. The patient denies any fevers, chills, constitutional symptoms. She also had recent lab work obtained after her fall which showed that her acute phase reactants were all well within normal limits. She is currently on hydrocodone and Zanaflex with minimal improvement and is using both a walker as well as bilateral canes.       Pain Assessment  Location of Pain: Pelvis  Location Modifiers: Right  Severity of Pain: 7  Quality of Pain: Dull, Aching  Duration of Pain: Persistent  Frequency of Pain: Constant  Aggravating Factors: Walking, Standing  Limiting Behavior: No  Relieving Factors: Rest  Result of Injury: Yes  Work-Related Injury: No  Are there other pain locations you wish to document?: No]       Medical History:  Past Medical History:   Diagnosis Date  Arthritis     Brain aneurysm     Hypertension     Neuropathy     PONV (postoperative nausea and vomiting)     after colonoscopy     Patient Active Problem List    Diagnosis Date Noted    Sacroiliac joint pain 11/30/2020     Current Outpatient Medications   Medication Sig Dispense Refill    predniSONE (DELTASONE) 10 MG tablet Take 30 mg/day for 1 week, then 20 mg/day for 1 week, followed by a Medrol Dosepak 35 tablet 1    methylPREDNISolone (MEDROL, ANA,) 4 MG tablet Take by mouth. 1 kit 0    HYDROcodone-acetaminophen (NORCO) 5-325 MG per tablet Take 1 tablet by mouth 2 times daily for 7 days. 14 tablet 0    celecoxib (CELEBREX) 200 MG capsule Take 200 mg by mouth daily      meloxicam (MOBIC) 15 MG tablet TAKE 1 TABLET BY MOUTH ONCE DAILY POST SURGERY      tiZANidine (ZANAFLEX) 4 MG tablet       LOSARTAN POTASSIUM PO Take by mouth      naproxen (NAPROXEN) 500 MG EC tablet Take 1 tablet by mouth 2 times daily as needed for Pain 20 tablet 0    gabapentin (NEURONTIN) 300 MG capsule Take 300 mg by mouth 2 times daily       doxepin (SINEQUAN) 25 MG capsule Take 25 mg by mouth      LORazepam (ATIVAN) 0.5 MG tablet Take 0.5 mg by mouth every 6 hours as needed for Anxiety      Loratadine (CLARITIN PO) Take  by mouth.  aspirin 81 MG tablet Take 81 mg by mouth daily.  Zolpidem Tartrate (AMBIEN PO) Take  by mouth.  NONFORMULARY every 30 days Indications: B 12 injection        No current facility-administered medications for this visit. Review of Systems:  Relevant review of systems reviewed and available in the patient's chart    Vital Signs: There were no vitals filed for this visit. General Exam:   Constitutional: Patient is adequately groomed with no evidence of malnutrition  DTRs: Deep tendon reflexes are intact  Mental Status: The patient is oriented to time, place and person. The patient's mood and affect are appropriate. Lymphatic: The lymphatic examination bilaterally reveals all areas to be without enlargement or induration. Vascular: Examination reveals no swelling or calf tenderness. Peripheral pulses are palpable and 2+. Neurological: The patient has good coordination. There is no weakness or sensory deficit. Right hip Examination:    Inspection:  No erythema or signs of infection. There are no cutaneous lesions. Palpation:  There is very mild tenderness over the greater trochanteric region. Most of her pain and tenderness is over the right SI joint region, sacral olena,, and gluteal area on the right. She does have some midline spine tenderness    Range of Motion: Full range of motion of the hip with no reproducible hip or groin pain. Strength: Hip strength is 4/5 limited secondary to pain in her lower lumbar area    Special Tests: Negative Scott's test.  Negative log roll maneuver. Negative Homans test.    Skin: There are no rashes, ulcerations or lesions. Gait: Slightly antalgic gait favoring the unaffected side. Reflex 2+ patellar    Additional Comments:       Additional Examinations:         Contralateral Exam: Examination of the left hip reveals intact skin. The patient demonstrates full painless range of motion with regards to flexion, abduction, internal and external rotation. There is no tenderness about the greater trochanter. There is a negative straight leg raise against resistance. Strength is 5/5 throughout all planes. Radiology:     X-rays previously obtained of the right hip are reviewed today in the office  Views 2 views including AP pelvis and lateral  Location right hip  Impression no fractures or malalignment identified. The femoral acetabular joint space appears well maintained. There is some sclerosing of the pubic symphysis.     MRI pelvis  FINDINGS:   Please see the separate report from the concurrent MRI lumbar spine for details regarding the visualized lower lumbar spine.       SOFT TISSUES: The visualized musculature is unremarkable.  The bilateral   hamstring, iliopsoas, gluteal, and proximal adductor tendons are intact.  The   bilateral sciatic nerves are unremarkable.  No abnormal soft tissue mass or   fluid collection.  Status post hysterectomy.       BONE MARROW: There are acute or subacute nondisplaced fractures of bilateral   sacral alae.  No displaced fracture or dislocation.  No suspicious marrow   space-occupying lesion.       JOINTS: The bilateral sacroiliac joints and hips are unremarkable.  Chronic   sclerosis and erosive changes of the bilateral pubic bones with mild widening   of the pubic symphysis.         Impression   1. Acute or subacute nondisplaced fractures of the bilateral sacral alae. 2. Chronic appearing sclerosis and erosions of the bilateral pubic bones. This may represent severe chronic osteitis pubis or osteomyelitis. Impression:  Encounter Diagnosis   Name Primary?     Sacral insufficiency fracture, initial encounter Yes       Office Procedures:  Orders Placed This Encounter   Procedures    C-Reactive Protein     Standing Status:   Future     Standing Expiration Date:   12/21/2021    Sedimentation Rate     Standing Status:   Future     Standing Expiration Date:   12/21/2021    CBC WITH AUTO DIFFERENTIAL     Standing Status:   Future     Standing Expiration Date:   12/21/2021 Treatment Plan:  We discussed her diagnosis and treatment options today. Patient does have acute sacral olena fractures. I took some time explaining the condition to her which is consistent with her current symptoms. She understands that these fractures may take 6 to 8 weeks to resolve with protected weightbearing, rest, and oral analgesics. Patient I did prescribe a prednisone taper to see if this would further help reduce her symptoms. It has been suggested that if her pain persists that a SI joint injection could be considered. I would recommend a follow-up with Dr. Aziza Stokes in 3 weeks if symptoms have not improved. 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 We will repeat the acute phase reactants to make sure that they continue to remain in normal limits. If these are elevated she may need to see Dr. Chris Martinez for possible biopsy.

## 2021-04-07 ENCOUNTER — OFFICE VISIT (OUTPATIENT)
Dept: PRIMARY CARE CLINIC | Age: 76
End: 2021-04-07
Payer: MEDICARE

## 2021-04-07 DIAGNOSIS — Z01.818 PREOP TESTING: Primary | ICD-10-CM

## 2021-04-07 LAB — SARS-COV-2: NOT DETECTED

## 2021-04-07 PROCEDURE — 99211 OFF/OP EST MAY X REQ PHY/QHP: CPT | Performed by: NURSE PRACTITIONER

## 2021-04-07 PROCEDURE — G8428 CUR MEDS NOT DOCUMENT: HCPCS | Performed by: NURSE PRACTITIONER

## 2021-04-07 PROCEDURE — G8417 CALC BMI ABV UP PARAM F/U: HCPCS | Performed by: NURSE PRACTITIONER

## 2021-04-07 NOTE — PATIENT INSTRUCTIONS

## 2021-04-07 NOTE — PROGRESS NOTES
Zenobia Juveharmony received a viral test for COVID-19. They were educated on isolation and quarantine as appropriate. For any symptoms, they were directed to seek care from their PCP, given contact information to establish with a doctor, directed to an urgent care or the emergency room.

## 2021-04-09 ENCOUNTER — HOSPITAL ENCOUNTER (OUTPATIENT)
Dept: PULMONOLOGY | Age: 76
Discharge: HOME | End: 2021-04-09
Payer: MEDICARE

## 2021-04-09 VITALS — OXYGEN SATURATION: 97 %

## 2021-04-09 PROCEDURE — 94726 PLETHYSMOGRAPHY LUNG VOLUMES: CPT

## 2021-04-09 PROCEDURE — 94640 AIRWAY INHALATION TREATMENT: CPT

## 2021-04-09 PROCEDURE — 6370000000 HC RX 637 (ALT 250 FOR IP): Performed by: NURSE PRACTITIONER

## 2021-04-09 PROCEDURE — 94060 EVALUATION OF WHEEZING: CPT

## 2021-04-09 PROCEDURE — 94729 DIFFUSING CAPACITY: CPT

## 2021-04-09 PROCEDURE — 94760 N-INVAS EAR/PLS OXIMETRY 1: CPT

## 2021-04-09 RX ORDER — ALBUTEROL SULFATE 90 UG/1
2 AEROSOL, METERED RESPIRATORY (INHALATION) ONCE
Status: COMPLETED | OUTPATIENT
Start: 2021-04-09 | End: 2021-04-09

## 2021-04-09 RX ADMIN — Medication 2 PUFF: at 11:30

## 2021-04-19 RX ORDER — TRAMADOL HYDROCHLORIDE 50 MG/1
50 TABLET ORAL EVERY 6 HOURS PRN
COMMUNITY

## 2021-04-19 NOTE — PROGRESS NOTES
PRE OP INSTRUCTION SHEET   1. Do not eat or drink anything after 12 midnight  prior to surgery. This includes no water, chewing gum or mints. 2. Take the following pills will a small sip of water (see MAR)                                        3. Aspirin, Ibuprofen, Advil, Naproxen, Vitamin E, fish oil and other Anti-inflammatory products should be stopped for 5 days before surgery or as directed by your physician. 4. Check with your Doctor regarding stopping Plavix, Coumadin, Lovenox, Fragmin or other blood thinners   5. Do not smoke, and do not drink any alcoholic beverages 24 hours prior to surgery. This includes NA Beer. 6. You may brush your teeth and gargle the morning of surgery. DO NOT SWALLOW WATER   7. You MUST make arrangements for a responsible adult to take you home after your surgery. You will not be allowed to leave alone or drive yourself home. It is strongly suggested someone stay with you the first 24 hrs. Your surgery will be cancelled if you do not have a ride home. 8. A parent/legal guardian must accompany a child scheduled for surgery and plan to stay at the hospital until the child is discharged. Please do not bring other children with you. 9. Please wear simple, loose fitting clothing to the hospital.  Elvia Marie not bring valuables (money, credit cards, checkbooks, etc.) Do not wear any makeup (including no eye makeup) or nail polish on your fingers or toes. 10. DO NOT wear any jewelry or piercings on day of surgery. All body piercing jewelry must be removed. 11. If you have dentures,glasses, or contacts they will be removed before going to the OR; we will provide you a container. 12. Please see your family doctor/and cardiologist for a history & physical and/or concerning medications. Bring any test results/reports from your physician's office. Have history and labs faxed to [phone].  Remember to bring Blood Bank bracelet on the day of surgery. 14. If you have a Living Will and Durable Power of  for Healthcare, please bring in a copy. 13. Notify your Surgeon if you develop any illness between now and surgery  time, cough, cold, fever, sore throat, nausea, vomiting, etc.  Please notify your surgeon if you experience dizziness, shortness of breath or blurred vision between now & the time of your surgery   16. DO NOT shave your operative site 96 hours prior to surgery. For face & neck surgery, men may use an electric razor 48 hours prior to surgery. 17. Shower with _x__Antibacterial soap (x_chlorhexidine for total joint  Pt's) shower two times before surgery.(the morning of and the night before. 18. To provide excellent care visitors will be limited to one in the room at any given time.   Please call pre admission testing if you any further questions [phone] or [phone]

## 2021-04-20 ENCOUNTER — OFFICE VISIT (OUTPATIENT)
Dept: PRIMARY CARE CLINIC | Age: 76
End: 2021-04-20
Payer: MEDICARE

## 2021-04-20 DIAGNOSIS — Z01.818 PREOP TESTING: Primary | ICD-10-CM

## 2021-04-20 PROCEDURE — G8417 CALC BMI ABV UP PARAM F/U: HCPCS | Performed by: NURSE PRACTITIONER

## 2021-04-20 PROCEDURE — 99211 OFF/OP EST MAY X REQ PHY/QHP: CPT | Performed by: NURSE PRACTITIONER

## 2021-04-20 PROCEDURE — G8428 CUR MEDS NOT DOCUMENT: HCPCS | Performed by: NURSE PRACTITIONER

## 2021-04-20 NOTE — PATIENT INSTRUCTIONS
Advance Care Planning  People with COVID-19 may have no symptoms, mild symptoms, such as fever, cough, and shortness of breath or they may have more severe illness, developing severe and fatal pneumonia. As a result, Advance Care Planning with attention to naming a health care decision maker (someone you trust to make healthcare decisions for you if you could not speak for yourself) and sharing other health care preferences is important BEFORE a possible health crisis. Please contact your Primary Care Provider to discuss Advance Care Planning. Preventing the Spread of Coronavirus Disease 2019 in Homes and Residential Communities  For the most recent information go to [email or web address]    Prevention steps for People with confirmed or suspected COVID-19 (including persons under investigation) who do not need to be hospitalized  and   People with confirmed COVID-19 who were hospitalized and determined to be medically stable to go home    Your healthcare provider and public health staff will evaluate whether you can be cared for at home. If it is determined that you do not need to be hospitalized and can be isolated at home, you will be monitored by staff from your local or state health department. You should follow the prevention steps below until a healthcare provider or local or state health department says you can return to your normal activities. Stay home except to get medical care  People who are mildly ill with COVID-19 are able to isolate at home during their illness. You should restrict activities outside your home, except for getting medical care. Do not go to work, school, or public areas. Avoid using public transportation, ride-sharing, or taxis. Separate yourself from other people and animals in your home  People: As much as possible, you should stay in a specific room and away from other people in your home.  Also, you should use a separate bathroom, if available. Animals: You should restrict contact with pets and other animals while you are sick with COVID-19, just like you would around other people. Although there have not been reports of pets or other animals becoming sick with COVID-19, it is still recommended that people sick with COVID-19 limit contact with animals until more information is known about the virus. When possible, have another member of your household care for your animals while you are sick. If you are sick with COVID-19, avoid contact with your pet, including petting, snuggling, being kissed or licked, and sharing food. If you must care for your pet or be around animals while you are sick, wash your hands before and after you interact with pets and wear a facemask. Call ahead before visiting your doctor  If you have a medical appointment, call the healthcare provider and tell them that you have or may have COVID-19. This will help the healthcare providers office take steps to keep other people from getting infected or exposed. Wear a facemask  You should wear a facemask when you are around other people (e.g., sharing a room or vehicle) or pets and before you enter a healthcare providers office. If you are not able to wear a facemask (for example, because it causes trouble breathing), then people who live with you should not stay in the same room with you, or they should wear a facemask if they enter your room. Cover your coughs and sneezes  Cover your mouth and nose with a tissue when you cough or sneeze. Throw used tissues in a lined trash can. Immediately wash your hands with soap and water for at least 20 seconds or, if soap and water are not available, clean your hands with an alcohol-based hand  that contains at least 60% alcohol.   Clean your hands often  Wash your hands often with soap and water for at least 20 seconds, especially after blowing your nose, coughing, or sneezing; going to the bathroom; and have a medical emergency and need to call 911, notify the dispatch personnel that you have, or are being evaluated for COVID-19. If possible, put on a facemask before emergency medical services arrive. Discontinuing home isolation  Patients with confirmed COVID-19 should remain under home isolation precautions until the risk of secondary transmission to others is thought to be low. The decision to discontinue home isolation precautions should be made on a case-by-case basis, in consultation with healthcare providers and state and local health departments.

## 2021-04-21 LAB — SARS-COV-2: NOT DETECTED

## 2021-04-22 ENCOUNTER — ANESTHESIA EVENT (OUTPATIENT)
Dept: OPERATING ROOM | Age: 76
End: 2021-04-22
Payer: MEDICARE

## 2021-04-22 NOTE — ANESTHESIA PRE PROCEDURE
Department of Anesthesiology  Preprocedure Note       Name:  Radha Kong   Age:  78 y.o.  :  1945                                          MRN:  [id number]         Date:  2021      Surgeon: Mynor King):  Erika Melgar MD    Procedure: Procedure(s):  PHACO EMULSIFICATION OF CATARACT WITH INTRAOCULAR LENS IMPLANT EYE    Medications prior to admission:   Prior to Admission medications    Medication Sig Start Date End Date Taking? Authorizing Provider   traMADol (ULTRAM) 50 MG tablet Take 50 mg by mouth every 6 hours as needed for Pain. Yes Historical Provider, MD   celecoxib (CELEBREX) 200 MG capsule Take 200 mg by mouth daily 20  Yes Historical Provider, MD   tiZANidine (ZANAFLEX) 4 MG tablet Take 4 mg by mouth every 6 hours as needed  10/21/20  Yes Historical Provider, MD   LOSARTAN POTASSIUM PO Take by mouth   Yes Historical Provider, MD   gabapentin (NEURONTIN) 300 MG capsule Take 300 mg by mouth 2 times daily    Yes Historical Provider, MD   doxepin (SINEQUAN) 25 MG capsule Take 25 mg by mouth 10/9/13  Yes Historical Provider, MD   LORazepam (ATIVAN) 0.5 MG tablet Take 0.5 mg by mouth every 6 hours as needed for Anxiety   Yes Historical Provider, MD   Loratadine (CLARITIN PO) Take  by mouth. Yes Historical Provider, MD   aspirin 81 MG tablet Take 81 mg by mouth daily. Yes Historical Provider, MD   Zolpidem Tartrate (AMBIEN PO) Take 5 mg by mouth nightly     Historical Provider, MD       Current medications:    No current facility-administered medications for this encounter. Current Outpatient Medications   Medication Sig Dispense Refill    traMADol (ULTRAM) 50 MG tablet Take 50 mg by mouth every 6 hours as needed for Pain.       celecoxib (CELEBREX) 200 MG capsule Take 200 mg by mouth daily      tiZANidine (ZANAFLEX) 4 MG tablet Take 4 mg by mouth every 6 hours as needed       LOSARTAN POTASSIUM PO Take by mouth      gabapentin (NEURONTIN) 300 MG capsule Take 300 mg by mouth 2 times daily       doxepin (SINEQUAN) 25 MG capsule Take 25 mg by mouth      LORazepam (ATIVAN) 0.5 MG tablet Take 0.5 mg by mouth every 6 hours as needed for Anxiety      Loratadine (CLARITIN PO) Take  by mouth.  aspirin 81 MG tablet Take 81 mg by mouth daily.  Zolpidem Tartrate (AMBIEN PO) Take 5 mg by mouth nightly          Allergies:  No Known Allergies    Problem List:    Patient Active Problem List   Diagnosis Code    Sacroiliac joint pain M53.3       Past Medical History:        Diagnosis Date    Arthritis     Brain aneurysm     Hypertension     Neuropathy     PONV (postoperative nausea and vomiting)     after colonoscopy       Past Surgical History:        Procedure Laterality Date    BRAIN ANEURYSM SURGERY      BUNIONECTOMY Left 03/08/2018    COLONOSCOPY  2013    JOINT REPLACEMENT      UPPER GASTROINTESTINAL ENDOSCOPY  28 Dec 2015    polyps bx       Social History:    Social History     Tobacco Use    Smoking status: Never Smoker    Smokeless tobacco: Never Used   Substance Use Topics    Alcohol use: No                                Counseling given: Not Answered      Vital Signs (Current):   Vitals:    04/19/21 1440   Weight: 180 lb (81.6 kg)   Height: 5' 7\" (1.702 m)                                              BP Readings from Last 3 Encounters:   11/24/20 132/75   11/27/18 132/77   03/08/18 114/78       NPO Status:                                                                                 BMI:   Wt Readings from Last 3 Encounters:   12/21/20 173 lb 1 oz (78.5 kg)   12/15/20 173 lb (78.5 kg)   12/08/20 173 lb (78.5 kg)     Body mass index is 28.19 kg/m².     CBC:   Lab Results   Component Value Date    WBC 4.9 12/21/2020    RBC 4.19 11/24/2020    HGB 12.2 12/21/2020    HCT 37.2 12/21/2020    MCV 84.3 12/21/2020    RDW 15.0 12/21/2020     12/21/2020       CMP:   Lab Results   Component Value Date     11/24/2020    K 3.8 11/24/2020    CL 97 11/24/2020    CO2 28 11/24/2020    BUN 15 11/24/2020    CREATININE 0.8 11/24/2020    GFRAA >60 11/24/2020    GFRAA >60 01/14/2013    AGRATIO 1.5 11/24/2020    LABGLOM >60 11/24/2020    GLUCOSE 88 11/24/2020    PROT 7.1 11/24/2020    PROT 6.5 01/14/2013    CALCIUM 8.8 11/24/2020    BILITOT 0.5 11/24/2020    ALKPHOS 117 11/24/2020    AST 22 11/24/2020    ALT 13 11/24/2020       POC Tests: No results for input(s): POCGLU, POCNA, POCK, POCCL, POCBUN, POCHEMO, POCHCT in the last 72 hours. Coags:   Lab Results   Component Value Date    PROTIME 11.7 12/17/2012    INR 1.03 12/17/2012       HCG (If Applicable): No results found for: PREGTESTUR, PREGSERUM, HCG, HCGQUANT     ABGs: No results found for: PHART, PO2ART, VXB0ITY, EOV9TCF, BEART, C2YASOXF     Type & Screen (If Applicable):  No results found for: LABABO, LABRH    Drug/Infectious Status (If Applicable):  No results found for: HIV, HEPCAB    COVID-19 Screening (If Applicable):   Lab Results   Component Value Date    COVID19 Not Detected 04/20/2021           Anesthesia Evaluation  Patient summary reviewed and Nursing notes reviewed   history of anesthetic complications: PONV. Airway: Mallampati: II  TM distance: >3 FB   Neck ROM: full  Mouth opening: > = 3 FB Dental: normal exam         Pulmonary:Negative Pulmonary ROS and normal exam                               Cardiovascular:    (+) hypertension:,                   Neuro/Psych:   Negative Neuro/Psych ROS              GI/Hepatic/Renal: Neg GI/Hepatic/Renal ROS       (-) GERD, liver disease and no renal disease       Endo/Other: Negative Endo/Other ROS       (-) diabetes mellitus               Abdominal:           Vascular: negative vascular ROS. Anesthesia Plan      MAC     ASA 2     (I discussed with the patient the risks and benefits of the plan.)  Induction: intravenous. MIPS: Prophylactic antiemetics administered. Anesthetic plan and risks discussed with patient.       Plan discussed with CRNA.          All questions answered and agrees with plan.         Karla Royal MD   4/22/2021

## 2021-04-23 ENCOUNTER — ANESTHESIA (OUTPATIENT)
Dept: OPERATING ROOM | Age: 76
End: 2021-04-23
Payer: MEDICARE

## 2021-04-23 ENCOUNTER — HOSPITAL ENCOUNTER (OUTPATIENT)
Age: 76
Setting detail: OUTPATIENT SURGERY
Discharge: HOME | End: 2021-04-23
Attending: OPHTHALMOLOGY | Admitting: OPHTHALMOLOGY
Payer: MEDICARE

## 2021-04-23 VITALS
DIASTOLIC BLOOD PRESSURE: 70 MMHG | HEART RATE: 70 BPM | OXYGEN SATURATION: 95 % | TEMPERATURE: 97.3 F | HEIGHT: 67 IN | BODY MASS INDEX: 28.25 KG/M2 | SYSTOLIC BLOOD PRESSURE: 112 MMHG | WEIGHT: 180 LBS | RESPIRATION RATE: 18 BRPM

## 2021-04-23 VITALS — SYSTOLIC BLOOD PRESSURE: 136 MMHG | OXYGEN SATURATION: 100 % | DIASTOLIC BLOOD PRESSURE: 67 MMHG

## 2021-04-23 PROCEDURE — 2500000003 HC RX 250 WO HCPCS: Performed by: OPHTHALMOLOGY

## 2021-04-23 PROCEDURE — 6360000002 HC RX W HCPCS: Performed by: OPHTHALMOLOGY

## 2021-04-23 PROCEDURE — V2632 POST CHMBR INTRAOCULAR LENS: HCPCS | Performed by: OPHTHALMOLOGY

## 2021-04-23 PROCEDURE — 2580000003 HC RX 258: Performed by: NURSE ANESTHETIST, CERTIFIED REGISTERED

## 2021-04-23 PROCEDURE — 2500000003 HC RX 250 WO HCPCS: Performed by: NURSE ANESTHETIST, CERTIFIED REGISTERED

## 2021-04-23 PROCEDURE — 2709999900 HC NON-CHARGEABLE SUPPLY: Performed by: OPHTHALMOLOGY

## 2021-04-23 PROCEDURE — 3600000003 HC SURGERY LEVEL 3 BASE: Performed by: OPHTHALMOLOGY

## 2021-04-23 PROCEDURE — 6360000002 HC RX W HCPCS: Performed by: NURSE ANESTHETIST, CERTIFIED REGISTERED

## 2021-04-23 PROCEDURE — 7100000010 HC PHASE II RECOVERY - FIRST 15 MIN: Performed by: OPHTHALMOLOGY

## 2021-04-23 PROCEDURE — 2580000003 HC RX 258: Performed by: ANESTHESIOLOGY

## 2021-04-23 PROCEDURE — 6370000000 HC RX 637 (ALT 250 FOR IP): Performed by: OPHTHALMOLOGY

## 2021-04-23 PROCEDURE — 3600000013 HC SURGERY LEVEL 3 ADDTL 15MIN: Performed by: OPHTHALMOLOGY

## 2021-04-23 PROCEDURE — 3700000000 HC ANESTHESIA ATTENDED CARE: Performed by: OPHTHALMOLOGY

## 2021-04-23 PROCEDURE — 3700000001 HC ADD 15 MINUTES (ANESTHESIA): Performed by: OPHTHALMOLOGY

## 2021-04-23 PROCEDURE — 7100000011 HC PHASE II RECOVERY - ADDTL 15 MIN: Performed by: OPHTHALMOLOGY

## 2021-04-23 DEVICE — ACRYSOF(R) IQ ASPHERIC IOL SP ACRYLIC FOLDABLELENS WULTRASERT(TM) DELIVERY SYSTEM UV WBLUE LIGHT FILTER. 13.0MM LENGTH 6.0MM ANTERIORASYMMETRIC BICONVEX OPTIC PLANAR HAPTICS.
Type: IMPLANTABLE DEVICE | Site: EYE | Status: FUNCTIONAL
Brand: ACRYSOF ULTRASERT

## 2021-04-23 RX ORDER — PILOCARPINE HYDROCHLORIDE 20 MG/ML
SOLUTION/ DROPS OPHTHALMIC PRN
Status: DISCONTINUED | OUTPATIENT
Start: 2021-04-23 | End: 2021-04-23 | Stop reason: ALTCHOICE

## 2021-04-23 RX ORDER — PROPOFOL 10 MG/ML
INJECTION, EMULSION INTRAVENOUS PRN
Status: DISCONTINUED | OUTPATIENT
Start: 2021-04-23 | End: 2021-04-23 | Stop reason: SDUPTHER

## 2021-04-23 RX ORDER — TOBRAMYCIN AND DEXAMETHASONE 3; 1 MG/ML; MG/ML
SUSPENSION/ DROPS OPHTHALMIC PRN
Status: DISCONTINUED | OUTPATIENT
Start: 2021-04-23 | End: 2021-04-23 | Stop reason: ALTCHOICE

## 2021-04-23 RX ORDER — SODIUM CHLORIDE 0.9 % (FLUSH) 0.9 %
10 SYRINGE (ML) INJECTION PRN
Status: DISCONTINUED | OUTPATIENT
Start: 2021-04-23 | End: 2021-04-23 | Stop reason: HOSPADM

## 2021-04-23 RX ORDER — LIDOCAINE HYDROCHLORIDE 20 MG/ML
INJECTION, SOLUTION INFILTRATION; PERINEURAL PRN
Status: DISCONTINUED | OUTPATIENT
Start: 2021-04-23 | End: 2021-04-23 | Stop reason: SDUPTHER

## 2021-04-23 RX ORDER — SODIUM CHLORIDE, SODIUM LACTATE, POTASSIUM CHLORIDE, CALCIUM CHLORIDE 600; 310; 30; 20 MG/100ML; MG/100ML; MG/100ML; MG/100ML
INJECTION, SOLUTION INTRAVENOUS CONTINUOUS PRN
Status: DISCONTINUED | OUTPATIENT
Start: 2021-04-23 | End: 2021-04-23 | Stop reason: SDUPTHER

## 2021-04-23 RX ORDER — PREDNISOLONE ACETATE 10 MG/ML
SUSPENSION/ DROPS OPHTHALMIC PRN
Status: DISCONTINUED | OUTPATIENT
Start: 2021-04-23 | End: 2021-04-23 | Stop reason: ALTCHOICE

## 2021-04-23 RX ORDER — SODIUM CHLORIDE 9 MG/ML
25 INJECTION, SOLUTION INTRAVENOUS PRN
Status: DISCONTINUED | OUTPATIENT
Start: 2021-04-23 | End: 2021-04-23 | Stop reason: HOSPADM

## 2021-04-23 RX ORDER — SODIUM CHLORIDE 0.9 % (FLUSH) 0.9 %
10 SYRINGE (ML) INJECTION EVERY 12 HOURS SCHEDULED
Status: DISCONTINUED | OUTPATIENT
Start: 2021-04-23 | End: 2021-04-23 | Stop reason: HOSPADM

## 2021-04-23 RX ORDER — LIDOCAINE HYDROCHLORIDE 10 MG/ML
1 INJECTION, SOLUTION EPIDURAL; INFILTRATION; INTRACAUDAL; PERINEURAL
Status: DISCONTINUED | OUTPATIENT
Start: 2021-04-23 | End: 2021-04-23 | Stop reason: HOSPADM

## 2021-04-23 RX ORDER — PREDNISOLONE ACETATE 10 MG/ML
1 SUSPENSION/ DROPS OPHTHALMIC CONTINUOUS
Status: DISCONTINUED | OUTPATIENT
Start: 2021-04-23 | End: 2021-04-23 | Stop reason: HOSPADM

## 2021-04-23 RX ORDER — TOBRAMYCIN AND DEXAMETHASONE 3; 1 MG/ML; MG/ML
1 SUSPENSION/ DROPS OPHTHALMIC CONTINUOUS
Status: DISCONTINUED | OUTPATIENT
Start: 2021-04-23 | End: 2021-04-23 | Stop reason: HOSPADM

## 2021-04-23 RX ORDER — TETRACAINE HYDROCHLORIDE 5 MG/ML
SOLUTION OPHTHALMIC PRN
Status: DISCONTINUED | OUTPATIENT
Start: 2021-04-23 | End: 2021-04-23 | Stop reason: ALTCHOICE

## 2021-04-23 RX ORDER — SODIUM CHLORIDE, POTASSIUM CHLORIDE, CALCIUM CHLORIDE, MAGNESIUM CHLORIDE, SODIUM ACETATE, AND SODIUM CITRATE 6.4; .75; .48; .3; 3.9; 1.7 MG/ML; MG/ML; MG/ML; MG/ML; MG/ML; MG/ML
SOLUTION IRRIGATION PRN
Status: DISCONTINUED | OUTPATIENT
Start: 2021-04-23 | End: 2021-04-23 | Stop reason: ALTCHOICE

## 2021-04-23 RX ORDER — SODIUM CHLORIDE, SODIUM LACTATE, POTASSIUM CHLORIDE, CALCIUM CHLORIDE 600; 310; 30; 20 MG/100ML; MG/100ML; MG/100ML; MG/100ML
INJECTION, SOLUTION INTRAVENOUS CONTINUOUS
Status: DISCONTINUED | OUTPATIENT
Start: 2021-04-23 | End: 2021-04-23 | Stop reason: HOSPADM

## 2021-04-23 RX ADMIN — PROPOFOL 70 MG: 10 INJECTION, EMULSION INTRAVENOUS at 09:04

## 2021-04-23 RX ADMIN — LIDOCAINE HYDROCHLORIDE 30 MG: 20 INJECTION, SOLUTION INFILTRATION; PERINEURAL at 09:04

## 2021-04-23 RX ADMIN — SODIUM CHLORIDE, POTASSIUM CHLORIDE, SODIUM LACTATE AND CALCIUM CHLORIDE: 600; 310; 30; 20 INJECTION, SOLUTION INTRAVENOUS at 09:01

## 2021-04-23 RX ADMIN — BROMFENAC SODIUM 1 DROP: 0.7 SOLUTION/ DROPS OPHTHALMIC at 07:33

## 2021-04-23 RX ADMIN — SODIUM CHLORIDE, POTASSIUM CHLORIDE, SODIUM LACTATE AND CALCIUM CHLORIDE: 600; 310; 30; 20 INJECTION, SOLUTION INTRAVENOUS at 07:42

## 2021-04-23 RX ADMIN — Medication 0.3 ML: at 07:33

## 2021-04-23 RX ADMIN — Medication 0.3 ML: at 07:43

## 2021-04-23 RX ADMIN — TOBRAMYCIN AND DEXAMETHASONE 1 DROP: 3; 1 SUSPENSION/ DROPS OPHTHALMIC at 09:48

## 2021-04-23 RX ADMIN — Medication 0.3 ML: at 07:52

## 2021-04-23 ASSESSMENT — PAIN - FUNCTIONAL ASSESSMENT: PAIN_FUNCTIONAL_ASSESSMENT: 0-10

## 2021-04-23 ASSESSMENT — PULMONARY FUNCTION TESTS
PIF_VALUE: 0

## 2021-04-23 ASSESSMENT — PAIN SCALES - GENERAL: PAINLEVEL_OUTOF10: 0

## 2021-04-23 NOTE — ANESTHESIA POSTPROCEDURE EVALUATION
Department of Anesthesiology  Postprocedure Note    Patient: Og Ahumada  MRN: [id number]  YOB: 1945  Date of evaluation: 4/23/2021  Time:  11:43 AM     Procedure Summary     Date: 04/23/21 Room / Location: [address] [place] [address]    Anesthesia Start: 0901 Anesthesia Stop: 8125    Procedure: PHACO EMULSIFICATION OF CATARACT WITH INTRAOCULAR LENS IMPLANT EYE (Left Eye) Diagnosis: (CATARACT LEFT EYE)    Surgeons: Bharat Prasad MD Responsible Provider: Giovanni Obregon MD    Anesthesia Type: MAC ASA Status: 2          Anesthesia Type: MAC    Noel Phase I: Noel Score: 10    Noel Phase II: Noel Score: 10    Last vitals: Reviewed and per EMR flowsheets.        Anesthesia Post Evaluation    Patient location during evaluation: bedside  Level of consciousness: awake  Airway patency: patent  Nausea & Vomiting: no nausea  Complications: no  Cardiovascular status: blood pressure returned to baseline  Respiratory status: acceptable  Hydration status: euvolemic

## 2021-04-23 NOTE — PROGRESS NOTES
Instructions and medicine given to jose. She verbalizes understanding. the patient escorted to car via w/c in good condition

## 2021-04-23 NOTE — OP NOTE
OPERATIVE NOTE    Patient Name   Date of Birth Age  MRN#  Ghulamemily Black   1945   78 y.o.  [id number]       Surgeon        Surgery Date  Espinoza Chavarria        4/23/2021       Preoperative Diagnosis: Nuclear Sclerotic Cataract left eye    Postoperative Diagnosis: Nuclear Sclerotic Cataract left eye    Operative Procedure: Phacoemulsification/ Posterior Chamber Intraocular Lens Implantation          Anesthesia:   Peribulbar Block with MAC    Details of Procedure: The patient was brought to the operating room on the operative stretcher in the supine position with the head resting in the head rest.  After appropriate anesthesia monitoring devices were applied, IV sedation was carried out per the anesthesia service. Once sedation was achieved, a peribulbar block was performed, using a 5 mL combination solution containing 4 mL of 2% lidocaine with 1 mL of Vitrase. Approximately 2-3 mL of this solution was administered in a peribulbar fashion through the lower lid of the operative eye. Once appropriate akinesia and anesthesia were demonstrated, the operative eye was prepped and draped in the usual sterile fashion. Tobradex drops and Tetracain drops were administered. A wire lid speculum was then inserted into the operative eye. A paracentesis was then performed using a 15 degree supersharp blade to enter the globe at the limbus, and a .12 mm colibri forceps was used to stabilize the globe. Viscoat was then instilled into the anterior chamber. A temporal clear cornea groove was then created using the 15 degree supersharp blade. The cornea was then entered using the Augustus 2.4 mm clearcut keratome blade. The capsulotomy was then performed using a cystotome, and the capsulorrhexis was completed using uttrata forceps. The nucleus of the cataract was then hydrodissected and hydrodelineated using sterile BSS on a 27 gauge cannula.   The nucleus of the cataract was then removed using the

## 2021-04-23 NOTE — H&P
Surgical Service History and Physical    CHIEF COMPLAINT:   Decreased Vision and Glare    Reason for Admission:  Cataract Surgery    History Obtained From:  Patient    HISTORY OF PRESENT ILLNESS:  Pt has noticed painless progressive loss of vision and is experiencing functional difficulty. Past Medical History:        Diagnosis Date    Arthritis     Brain aneurysm     Hypertension     Neuropathy     PONV (postoperative nausea and vomiting)     after colonoscopy       Past Surgical History:        Procedure Laterality Date    BRAIN ANEURYSM SURGERY      BUNIONECTOMY Left 03/08/2018    COLONOSCOPY  2013    JOINT REPLACEMENT      UPPER GASTROINTESTINAL ENDOSCOPY  28 Dec 2015    polyps bx       Allergies:  Patient has no known allergies. Prior to Admission medications    Medication Sig Start Date End Date Taking? Authorizing Provider   traMADol (ULTRAM) 50 MG tablet Take 50 mg by mouth every 6 hours as needed for Pain. Yes Historical Provider, MD   celecoxib (CELEBREX) 200 MG capsule Take 200 mg by mouth daily 9/4/20  Yes Historical Provider, MD   tiZANidine (ZANAFLEX) 4 MG tablet Take 4 mg by mouth every 6 hours as needed  10/21/20  Yes Historical Provider, MD   LOSARTAN POTASSIUM PO Take by mouth   Yes Historical Provider, MD   gabapentin (NEURONTIN) 300 MG capsule Take 300 mg by mouth 2 times daily    Yes Historical Provider, MD   doxepin (SINEQUAN) 25 MG capsule Take 25 mg by mouth 10/9/13  Yes Historical Provider, MD   LORazepam (ATIVAN) 0.5 MG tablet Take 0.5 mg by mouth every 6 hours as needed for Anxiety   Yes Historical Provider, MD   Loratadine (CLARITIN PO) Take  by mouth. Yes Historical Provider, MD   aspirin 81 MG tablet Take 81 mg by mouth daily.    Yes Historical Provider, MD   Zolpidem Tartrate (AMBIEN PO) Take 5 mg by mouth nightly    Yes Historical Provider, MD         REVIEW OF SYSTEMS:    CONSTITUTIONAL:  negative  EYES: negative  HEENT:  negative  RESPIRATORY: negative  CARDIOVASCULAR:  negative  MUSCULOSKELETAL:  negative  NEUROLOGICAL:  negative    PHYSICAL EXAM:    VITALS:  BP (!) 147/75   Pulse 63   Temp 97.5 °F (36.4 °C)   Resp 18   Ht 5' 7\" (1.702 m)   Wt 180 lb (81.6 kg)   SpO2 97%   BMI 28.19 kg/m²   TEMPERATURE:  Current - Temp: 97.5 °F (36.4 °C); Max - Temp  Av.5 °F (36.4 °C)  Min: 97.5 °F (36.4 °C)  Max: 97.5 °F (36.4 °C)  RESPIRATIONS RANGE: Resp  Av  Min: 18  Max: 18  PULSE RANGE: Pulse  Av  Min: 63  Max: 63  BLOOD PRESSURE RANGE:  Systolic (34QWG), GEB:596 , Min:147 , MILI:681   ; Diastolic (48BRM), VMA:71, Min:75, Max:75    CONSTITUTIONAL:  awake, alert, cooperative, no apparent distress, and appears stated age  EYES:  Lids and lashes normal, pupils equal, round and reactive to light, extra ocular muscles intact, sclera clear, conjunctiva normal  ENT:  Normocephalic, without obvious abnormality, atramatic, sinuses nontender on palpation, external ears without lesions, oral pharynx with moist mucus membranes, tonsils without erythema or exudates, gums normal and good dentition. NECK:  Supple, symmetrical, trachea midline, no adenopathy, thyroid symmetric, not enlarged and no tenderness, skin normal  LUNGS:  No increased work of breathing, good air exchange, clear to auscultation bilaterally, no crackles or wheezing  CARDIOVASCULAR:  Normal apical impulse, regular rate and rhythm, normal S1 and S2, no S3 or S4, and no murmur noted  ABDOMEN:  No scars, normal bowel sounds, soft, non-distended, non-tender, no masses palpated, no hepatosplenomegally  MUSCULOSKELETAL:  There is no redness, warmth, or swelling of the joints. Full range of motion noted. Motor strength is 5 out of 5 all extremities bilaterally. Tone is normal.  NEUROLOGIC:  Awake, alert, oriented to name, place and time. Cranial nerves II-XII are grossly intact. Motor is 5 out of 5 bilaterally. Cerebellar finger to nose, heel to shin intact. Sensory is intact.   Babinski down going, Romberg negative, and gait is normal.      Impression: Visually Significant Cataract    Plan: Sherman Em

## 2021-05-10 ENCOUNTER — OFFICE VISIT (OUTPATIENT)
Dept: PRIMARY CARE CLINIC | Age: 76
End: 2021-05-10
Payer: MEDICARE

## 2021-05-10 DIAGNOSIS — Z01.818 PREOP TESTING: Primary | ICD-10-CM

## 2021-05-10 PROCEDURE — G8417 CALC BMI ABV UP PARAM F/U: HCPCS | Performed by: NURSE PRACTITIONER

## 2021-05-10 PROCEDURE — 99211 OFF/OP EST MAY X REQ PHY/QHP: CPT | Performed by: NURSE PRACTITIONER

## 2021-05-10 PROCEDURE — G8428 CUR MEDS NOT DOCUMENT: HCPCS | Performed by: NURSE PRACTITIONER

## 2021-05-10 NOTE — PROGRESS NOTES
PRE OP INSTRUCTION SHEET   1. Do not eat or drink anything after 12 midnight  prior to surgery. This includes no water, chewing gum or mints. 2. Take the following pills will a small sip of water (see MAR)                                        3. Aspirin, Ibuprofen, Advil, Naproxen, Vitamin E, fish oil and other Anti-inflammatory products should be stopped for 5 days before surgery or as directed by your physician. 4. Check with your Doctor regarding stopping Plavix, Coumadin, Lovenox, Fragmin or other blood thinners   5. Do not smoke, and do not drink any alcoholic beverages 24 hours prior to surgery. This includes NA Beer. 6. You may brush your teeth and gargle the morning of surgery. DO NOT SWALLOW WATER   7. You MUST make arrangements for a responsible adult to take you home after your surgery. You will not be allowed to leave alone or drive yourself home. It is strongly suggested someone stay with you the first 24 hrs. Your surgery will be cancelled if you do not have a ride home. 8. A parent/legal guardian must accompany a child scheduled for surgery and plan to stay at the hospital until the child is discharged. Please do not bring other children with you. 9. Please wear simple, loose fitting clothing to the hospital.  Maricel Friendly not bring valuables (money, credit cards, checkbooks, etc.) Do not wear any makeup (including no eye makeup) or nail polish on your fingers or toes. 10. DO NOT wear any jewelry or piercings on day of surgery. All body piercing jewelry must be removed. 11. If you have dentures,glasses, or contacts they will be removed before going to the OR; we will provide you a container. 12. Please see your family doctor/and cardiologist for a history & physical and/or concerning medications. Bring any test results/reports from your physician's office. Have history and labs faxed to [phone].  Remember to bring Blood Bank bracelet on the day of surgery. 14. If you have a Living Will and Durable Power of  for Healthcare, please bring in a copy. 13. Notify your Surgeon if you develop any illness between now and surgery  time, cough, cold, fever, sore throat, nausea, vomiting, etc.  Please notify your surgeon if you experience dizziness, shortness of breath or blurred vision between now & the time of your surgery   16. DO NOT shave your operative site 96 hours prior to surgery. For face & neck surgery, men may use an electric razor 48 hours prior to surgery. 17. Shower with _x__Antibacterial soap (x_chlorhexidine for total joint  Pt's) shower two times before surgery.(the morning of and the night before. 18. To provide excellent care visitors will be limited to one in the room at any given time.   Please call pre admission testing if you any further questions [phone] or [phone]

## 2021-05-10 NOTE — PATIENT INSTRUCTIONS

## 2021-05-10 NOTE — PROGRESS NOTES
Isacc Quintero received a viral test for COVID-19. They were educated on isolation and quarantine as appropriate. For any symptoms, they were directed to seek care from their PCP, given contact information to establish with a doctor, directed to an urgent care or the emergency room.

## 2021-05-11 LAB — SARS-COV-2: NOT DETECTED

## 2021-05-13 ENCOUNTER — ANESTHESIA EVENT (OUTPATIENT)
Dept: OPERATING ROOM | Age: 76
End: 2021-05-13
Payer: MEDICARE

## 2021-05-14 ENCOUNTER — HOSPITAL ENCOUNTER (OUTPATIENT)
Age: 76
Setting detail: OUTPATIENT SURGERY
Discharge: HOME | End: 2021-05-14
Attending: OPHTHALMOLOGY | Admitting: OPHTHALMOLOGY
Payer: MEDICARE

## 2021-05-14 ENCOUNTER — ANESTHESIA (OUTPATIENT)
Dept: OPERATING ROOM | Age: 76
End: 2021-05-14
Payer: MEDICARE

## 2021-05-14 VITALS
RESPIRATION RATE: 12 BRPM | DIASTOLIC BLOOD PRESSURE: 78 MMHG | HEART RATE: 60 BPM | OXYGEN SATURATION: 97 % | HEIGHT: 67 IN | BODY MASS INDEX: 27.94 KG/M2 | SYSTOLIC BLOOD PRESSURE: 127 MMHG | WEIGHT: 178 LBS | TEMPERATURE: 96.9 F

## 2021-05-14 VITALS — OXYGEN SATURATION: 100 % | DIASTOLIC BLOOD PRESSURE: 67 MMHG | SYSTOLIC BLOOD PRESSURE: 118 MMHG

## 2021-05-14 PROCEDURE — 6360000002 HC RX W HCPCS: Performed by: OPHTHALMOLOGY

## 2021-05-14 PROCEDURE — 3600000013 HC SURGERY LEVEL 3 ADDTL 15MIN: Performed by: OPHTHALMOLOGY

## 2021-05-14 PROCEDURE — 2500000003 HC RX 250 WO HCPCS: Performed by: OPHTHALMOLOGY

## 2021-05-14 PROCEDURE — 2580000003 HC RX 258: Performed by: NURSE ANESTHETIST, CERTIFIED REGISTERED

## 2021-05-14 PROCEDURE — 3700000001 HC ADD 15 MINUTES (ANESTHESIA): Performed by: OPHTHALMOLOGY

## 2021-05-14 PROCEDURE — 3700000000 HC ANESTHESIA ATTENDED CARE: Performed by: OPHTHALMOLOGY

## 2021-05-14 PROCEDURE — 7100000010 HC PHASE II RECOVERY - FIRST 15 MIN: Performed by: OPHTHALMOLOGY

## 2021-05-14 PROCEDURE — 6370000000 HC RX 637 (ALT 250 FOR IP): Performed by: OPHTHALMOLOGY

## 2021-05-14 PROCEDURE — V2632 POST CHMBR INTRAOCULAR LENS: HCPCS | Performed by: OPHTHALMOLOGY

## 2021-05-14 PROCEDURE — 2500000003 HC RX 250 WO HCPCS: Performed by: ANESTHESIOLOGY

## 2021-05-14 PROCEDURE — 2709999900 HC NON-CHARGEABLE SUPPLY: Performed by: OPHTHALMOLOGY

## 2021-05-14 PROCEDURE — 3600000003 HC SURGERY LEVEL 3 BASE: Performed by: OPHTHALMOLOGY

## 2021-05-14 PROCEDURE — 6360000002 HC RX W HCPCS: Performed by: NURSE ANESTHETIST, CERTIFIED REGISTERED

## 2021-05-14 PROCEDURE — 7100000011 HC PHASE II RECOVERY - ADDTL 15 MIN: Performed by: OPHTHALMOLOGY

## 2021-05-14 PROCEDURE — 2500000003 HC RX 250 WO HCPCS: Performed by: NURSE ANESTHETIST, CERTIFIED REGISTERED

## 2021-05-14 DEVICE — ACRYSOF(R) SINGLE-PIECE ACRYLIC FOLDABLE IOL,UV-ABSORBING POSTERIOR CHAMBER LENS (IOL/PC),13.0MM LENGTH, 6.0MM BICONVEX OPTIC, PLANARHAPTICS.
Type: IMPLANTABLE DEVICE | Site: EYE | Status: FUNCTIONAL
Brand: ACRYSOF®

## 2021-05-14 RX ORDER — TOBRAMYCIN AND DEXAMETHASONE 3; 1 MG/ML; MG/ML
1 SUSPENSION/ DROPS OPHTHALMIC SEE ADMIN INSTRUCTIONS
Status: DISCONTINUED | OUTPATIENT
Start: 2021-05-14 | End: 2021-05-14 | Stop reason: HOSPADM

## 2021-05-14 RX ORDER — SODIUM CHLORIDE, SODIUM LACTATE, POTASSIUM CHLORIDE, CALCIUM CHLORIDE 600; 310; 30; 20 MG/100ML; MG/100ML; MG/100ML; MG/100ML
INJECTION, SOLUTION INTRAVENOUS CONTINUOUS
Status: DISCONTINUED | OUTPATIENT
Start: 2021-05-14 | End: 2021-05-14 | Stop reason: HOSPADM

## 2021-05-14 RX ORDER — PREDNISOLONE ACETATE 10 MG/ML
SUSPENSION/ DROPS OPHTHALMIC PRN
Status: DISCONTINUED | OUTPATIENT
Start: 2021-05-14 | End: 2021-05-14 | Stop reason: ALTCHOICE

## 2021-05-14 RX ORDER — LIDOCAINE HYDROCHLORIDE 20 MG/ML
INJECTION, SOLUTION INFILTRATION; PERINEURAL PRN
Status: DISCONTINUED | OUTPATIENT
Start: 2021-05-14 | End: 2021-05-14 | Stop reason: SDUPTHER

## 2021-05-14 RX ORDER — TETRACAINE HYDROCHLORIDE 5 MG/ML
SOLUTION OPHTHALMIC PRN
Status: DISCONTINUED | OUTPATIENT
Start: 2021-05-14 | End: 2021-05-14 | Stop reason: ALTCHOICE

## 2021-05-14 RX ORDER — LIDOCAINE HYDROCHLORIDE 10 MG/ML
1 INJECTION, SOLUTION EPIDURAL; INFILTRATION; INTRACAUDAL; PERINEURAL
Status: COMPLETED | OUTPATIENT
Start: 2021-05-14 | End: 2021-05-14

## 2021-05-14 RX ORDER — ATROPINE SULFATE 0.4 MG/ML
AMPUL (ML) INJECTION PRN
Status: DISCONTINUED | OUTPATIENT
Start: 2021-05-14 | End: 2021-05-14 | Stop reason: SDUPTHER

## 2021-05-14 RX ORDER — PREDNISOLONE ACETATE 10 MG/ML
1 SUSPENSION/ DROPS OPHTHALMIC SEE ADMIN INSTRUCTIONS
Status: DISCONTINUED | OUTPATIENT
Start: 2021-05-14 | End: 2021-05-14 | Stop reason: HOSPADM

## 2021-05-14 RX ORDER — SODIUM CHLORIDE 9 MG/ML
25 INJECTION, SOLUTION INTRAVENOUS PRN
Status: DISCONTINUED | OUTPATIENT
Start: 2021-05-14 | End: 2021-05-14 | Stop reason: HOSPADM

## 2021-05-14 RX ORDER — PILOCARPINE HYDROCHLORIDE 20 MG/ML
SOLUTION/ DROPS OPHTHALMIC PRN
Status: DISCONTINUED | OUTPATIENT
Start: 2021-05-14 | End: 2021-05-14 | Stop reason: ALTCHOICE

## 2021-05-14 RX ORDER — SODIUM CHLORIDE, SODIUM LACTATE, POTASSIUM CHLORIDE, CALCIUM CHLORIDE 600; 310; 30; 20 MG/100ML; MG/100ML; MG/100ML; MG/100ML
INJECTION, SOLUTION INTRAVENOUS CONTINUOUS PRN
Status: DISCONTINUED | OUTPATIENT
Start: 2021-05-14 | End: 2021-05-14 | Stop reason: SDUPTHER

## 2021-05-14 RX ORDER — PROPOFOL 10 MG/ML
INJECTION, EMULSION INTRAVENOUS PRN
Status: DISCONTINUED | OUTPATIENT
Start: 2021-05-14 | End: 2021-05-14 | Stop reason: SDUPTHER

## 2021-05-14 RX ORDER — SODIUM CHLORIDE 0.9 % (FLUSH) 0.9 %
10 SYRINGE (ML) INJECTION EVERY 12 HOURS SCHEDULED
Status: DISCONTINUED | OUTPATIENT
Start: 2021-05-14 | End: 2021-05-14 | Stop reason: HOSPADM

## 2021-05-14 RX ORDER — SODIUM CHLORIDE 0.9 % (FLUSH) 0.9 %
10 SYRINGE (ML) INJECTION PRN
Status: DISCONTINUED | OUTPATIENT
Start: 2021-05-14 | End: 2021-05-14 | Stop reason: HOSPADM

## 2021-05-14 RX ORDER — SODIUM CHLORIDE, POTASSIUM CHLORIDE, CALCIUM CHLORIDE, MAGNESIUM CHLORIDE, SODIUM ACETATE, AND SODIUM CITRATE 6.4; .75; .48; .3; 3.9; 1.7 MG/ML; MG/ML; MG/ML; MG/ML; MG/ML; MG/ML
SOLUTION IRRIGATION PRN
Status: DISCONTINUED | OUTPATIENT
Start: 2021-05-14 | End: 2021-05-14 | Stop reason: ALTCHOICE

## 2021-05-14 RX ORDER — TOBRAMYCIN AND DEXAMETHASONE 3; 1 MG/ML; MG/ML
SUSPENSION/ DROPS OPHTHALMIC PRN
Status: DISCONTINUED | OUTPATIENT
Start: 2021-05-14 | End: 2021-05-14 | Stop reason: ALTCHOICE

## 2021-05-14 RX ADMIN — TOBRAMYCIN AND DEXAMETHASONE 1 DROP: 3; 1 SUSPENSION/ DROPS OPHTHALMIC at 10:05

## 2021-05-14 RX ADMIN — BROMFENAC SODIUM 1 DROP: 0.7 SOLUTION/ DROPS OPHTHALMIC at 07:27

## 2021-05-14 RX ADMIN — Medication 0.2 MG: at 09:14

## 2021-05-14 RX ADMIN — Medication 0.3 ML: at 07:32

## 2021-05-14 RX ADMIN — SODIUM CHLORIDE, POTASSIUM CHLORIDE, SODIUM LACTATE AND CALCIUM CHLORIDE: 600; 310; 30; 20 INJECTION, SOLUTION INTRAVENOUS at 09:08

## 2021-05-14 RX ADMIN — Medication 0.3 ML: at 08:15

## 2021-05-14 RX ADMIN — Medication 0.3 ML: at 07:27

## 2021-05-14 RX ADMIN — LIDOCAINE HYDROCHLORIDE 1 ML: 10 INJECTION, SOLUTION EPIDURAL; INFILTRATION; INTRACAUDAL; PERINEURAL at 07:35

## 2021-05-14 RX ADMIN — LIDOCAINE HYDROCHLORIDE 20 MG: 20 INJECTION, SOLUTION INFILTRATION; PERINEURAL at 09:18

## 2021-05-14 RX ADMIN — PROPOFOL 60 MG: 10 INJECTION, EMULSION INTRAVENOUS at 09:18

## 2021-05-14 ASSESSMENT — PAIN SCALES - GENERAL: PAINLEVEL_OUTOF10: 0

## 2021-05-14 NOTE — ANESTHESIA POSTPROCEDURE EVALUATION
Department of Anesthesiology  Postprocedure Note    Patient: Theron Smith  MRN: [id number]  YOB: 1945  Date of evaluation: 5/14/2021  Time:  1:11 PM     Procedure Summary     Date: 05/14/21 Room / Location: [place] [address] / [facility]    Anesthesia Start: 0908 Anesthesia Stop: 8347    Procedure: PHACO EMULSIFICATION OF CATARACT WITH INTRAOCULAR LENS IMPLANT EYE (Right Eye) Diagnosis: (CATARACT RIGHT EYE)    Surgeons: Lis Saavedra MD Responsible Provider: Ivan Patel MD    Anesthesia Type: general ASA Status: 2          Anesthesia Type: general    Noel Phase I: Noel Score: 10    Noel Phase II: Noel Score: 10    Last vitals: Reviewed and per EMR flowsheets.        Anesthesia Post Evaluation    Comments: Postoperative Anesthesia Note    Name:    Theron Smith  MRN:      [id number]    Patient Vitals in the past 12 hrs:  05/14/21 1003, BP:127/78, Pulse:60, Resp:12, SpO2:97 %  05/14/21 0944, BP:125/74, Temp:96.9 °F (36.1 °C), Temp src:Temporal, Pulse:54, Resp:20, SpO2:94 %  05/14/21 0728, BP:118/67, Temp:97 °F (36.1 °C), Temp src:Infrared, Pulse:51, Resp:18, SpO2:96 %, Height:5' 7\" (1.702 m), Weight:178 lb (80.7 kg)     LABS:    CBC  Lab Results       Component                Value               Date/Time                  WBC                      4.9                 12/21/2020 02:56 PM        HGB                      12.2                12/21/2020 02:56 PM        HCT                      37.2                12/21/2020 02:56 PM        PLT                      324                 12/21/2020 02:56 PM   RENAL  Lab Results       Component                Value               Date/Time                  NA                       133 (L)             11/24/2020 01:35 PM        K                        3.8                 11/24/2020 01:35 PM        CL                       97 (L)              11/24/2020 01:35 PM        CO2                      28                  11/24/2020 01:35 PM        BUN 15                  11/24/2020 01:35 PM        CREATININE               0.8                 11/24/2020 01:35 PM        GLUCOSE                  88                  11/24/2020 01:35 PM   COAGS  Lab Results       Component                Value               Date/Time                  PROTIME                  11.7                12/17/2012 05:22 PM        INR                      1.03                12/17/2012 05:22 PM     Intake & Output:  @59UMHS@    Nausea & Vomiting:  No    Level of Consciousness:  Awake    Pain Assessment:  Adequate analgesia    Anesthesia Complications:  No apparent anesthetic complications    SUMMARY      Vital signs stable  OK to discharge from Stage I post anesthesia care.   Care transferred from Anesthesiology department on discharge from perioperative area

## 2021-05-14 NOTE — OP NOTE
OPERATIVE NOTE    Patient Name   Date of Birth Age  MRNGodwin Perry   1945   78 y.o.  [id number]       Surgeon        Surgery Date  Prakash Ny        5/14/2021       Preoperative Diagnosis: Nuclear Sclerotic Cataract right eye    Postoperative Diagnosis: Nuclear Sclerotic Cataract right eye    Operative Procedure: Phacoemulsification/ Posterior Chamber Intraocular Lens Implantation          Anesthesia:   Peribulbar Block with MAC    Details of Procedure: The patient was brought to the operating room on the operative stretcher in the supine position with the head resting in the head rest.  After appropriate anesthesia monitoring devices were applied, IV sedation was carried out per the anesthesia service. Once sedation was achieved, a peribulbar block was performed, using a 5 mL combination solution containing 4 mL of 2% lidocaine with 1 mL of Vitrase. Approximately 2-3 mL of this solution was administered in a peribulbar fashion through the lower lid of the operative eye. Once appropriate akinesia and anesthesia were demonstrated, the operative eye was prepped and draped in the usual sterile fashion. Tobradex drops and Tetracain drops were administered. A wire lid speculum was then inserted into the operative eye. A paracentesis was then performed using a 15 degree supersharp blade to enter the globe at the limbus, and a .12 mm colibri forceps was used to stabilize the globe. Viscoat was then instilled into the anterior chamber. A temporal clear cornea groove was then created using the 15 degree supersharp blade. The cornea was then entered using the Augustus 2.4 mm clearcut keratome blade. The capsulotomy was then performed using a cystotome, and the capsulorrhexis was completed using uttrata forceps. The nucleus of the cataract was then hydrodissected and hydrodelineated using sterile BSS on a 27 gauge cannula.   The nucleus of the cataract was then removed using the phacoemilsification/aspiration device. This was performed in a bimanual/CHOP technique. The remaining cortex was then removed using the irrigation/aspiration device. The posterior capsule was polished using the I/A device with CAP/VAC settings. The capsular bag was reformed using Provisc. The previously selected Augustus Acrysof +18.0 diopter SA60AT intraocular lens implant was then inserted using the cartridge system. It was spun in place using a Kuglen hook. It was centered and found to be in good, stable position. The remaining viscoelastic was then removed using the I/A device. The corneal incision was then hydrated using sterile BSS on a 30 gauge cannula. It was checked and found to be water-tight. Pilocarpine 2%, followed by Tobradex ophthalmic solutions were then instilled into the operative eye. The wire lid speculum was removed, and a postoperative protective shield was applied. The patient was then transferred to the postanesthesia recovery unit in good stable condition. The patient tolerated the procedure well without complications. A postoperative instruction sheet was given, and the patient will follow up with Dr. Bustillos Graft office as scheduled. EBL: none    Specimen: noneOperative Note      Patient: Sam Rao  YOB: 1945  MRN: [id number]    Date of Procedure: 5/14/2021    Pre-Op Diagnosis: CATARACT RIGHT EYE    Post-Op Diagnosis: Same       Procedure(s):  PHACO EMULSIFICATION OF CATARACT WITH INTRAOCULAR LENS IMPLANT EYE    Surgeon(s):  Christina Bhakta MD    Assistant:   * No surgical staff found *    Anesthesia: Monitor Anesthesia Care    Estimated Blood Loss (mL): none    Complications: None    Specimens:   * No specimens in log *    Implants:  Implant Name Type Inv.  Item Serial No.  Lot No. LRB No. Used Action   LENS INTOCU 6.0 TO 30.0 DIOPT 118.4 A CONSTANT 0DEG ANG - [id number]  LENS INTOCU 6.0 TO 30.0 DIOPT 118.4 A CONSTANT 0DEG ANG [id number] [facility]-  Right 1 Implanted         Drains: * No LDAs found *    Findings:     Detailed Description of Procedure:       Electronically signed by Erika Melgar MD on 5/14/2021 at 9:43 AM

## 2021-05-14 NOTE — ANESTHESIA PRE PROCEDURE
Department of Anesthesiology  Preprocedure Note       Name:  Jeromy Perry   Age:  78 y.o.  :  1945                                          MRN:  [id number]         Date:  2021      Surgeon: Osiris Sutton):  Prakash Ny MD    Procedure: Procedure(s):  PHACO EMULSIFICATION OF CATARACT WITH INTRAOCULAR LENS IMPLANT EYE    Medications prior to admission:   Prior to Admission medications    Medication Sig Start Date End Date Taking? Authorizing Provider   traMADol (ULTRAM) 50 MG tablet Take 50 mg by mouth every 6 hours as needed for Pain. Yes Historical Provider, MD   celecoxib (CELEBREX) 200 MG capsule Take 200 mg by mouth daily 20  Yes Historical Provider, MD   tiZANidine (ZANAFLEX) 4 MG tablet Take 4 mg by mouth every 6 hours as needed  10/21/20  Yes Historical Provider, MD   LOSARTAN POTASSIUM PO Take by mouth   Yes Historical Provider, MD   gabapentin (NEURONTIN) 300 MG capsule Take 300 mg by mouth 2 times daily    Yes Historical Provider, MD   doxepin (SINEQUAN) 25 MG capsule Take 25 mg by mouth 10/9/13  Yes Historical Provider, MD   LORazepam (ATIVAN) 0.5 MG tablet Take 0.5 mg by mouth every 6 hours as needed for Anxiety   Yes Historical Provider, MD   Loratadine (CLARITIN PO) Take  by mouth. Yes Historical Provider, MD   aspirin 81 MG tablet Take 81 mg by mouth daily. Yes Historical Provider, MD   Zolpidem Tartrate (AMBIEN PO) Take 5 mg by mouth nightly    Yes Historical Provider, MD       Current medications:    No current facility-administered medications for this encounter. Current Outpatient Medications   Medication Sig Dispense Refill    traMADol (ULTRAM) 50 MG tablet Take 50 mg by mouth every 6 hours as needed for Pain.       celecoxib (CELEBREX) 200 MG capsule Take 200 mg by mouth daily      tiZANidine (ZANAFLEX) 4 MG tablet Take 4 mg by mouth every 6 hours as needed       LOSARTAN POTASSIUM PO Take by mouth      gabapentin (NEURONTIN) 300 MG capsule Take 300 mg by mouth 2 times daily       doxepin (SINEQUAN) 25 MG capsule Take 25 mg by mouth      LORazepam (ATIVAN) 0.5 MG tablet Take 0.5 mg by mouth every 6 hours as needed for Anxiety      Loratadine (CLARITIN PO) Take  by mouth.  aspirin 81 MG tablet Take 81 mg by mouth daily.  Zolpidem Tartrate (AMBIEN PO) Take 5 mg by mouth nightly          Allergies:  No Known Allergies    Problem List:    Patient Active Problem List   Diagnosis Code    Sacroiliac joint pain M53.3       Past Medical History:        Diagnosis Date    Arthritis     Brain aneurysm     Hypertension     Neuropathy     PONV (postoperative nausea and vomiting)     after colonoscopy       Past Surgical History:        Procedure Laterality Date    BRAIN ANEURYSM SURGERY      BUNIONECTOMY Left 03/08/2018    COLONOSCOPY  2013    INTRACAPSULAR CATARACT EXTRACTION Left 4/23/2021    PHACO EMULSIFICATION OF CATARACT WITH INTRAOCULAR LENS IMPLANT EYE performed by Lonia Babinski, MD at [address] [place] [address] OTHER SURGICAL HISTORY       PHACO EMULSIFICATION OF CATARACT WITH INTRAOCULAR LENS IMPLANT EYE (Left Eye)    UPPER GASTROINTESTINAL ENDOSCOPY  28 Dec 2015    polyps bx       Social History:    Social History     Tobacco Use    Smoking status: Never Smoker    Smokeless tobacco: Never Used   Substance Use Topics    Alcohol use: No                                Counseling given: Not Answered      Vital Signs (Current):   Vitals:    05/10/21 1509   Weight: 180 lb (81.6 kg)   Height: 5' 7\" (1.702 m)                                              BP Readings from Last 3 Encounters:   04/23/21 136/67   04/23/21 112/70   11/24/20 132/75       NPO Status:                                                                                 BMI:   Wt Readings from Last 3 Encounters:   04/23/21 180 lb (81.6 kg)   12/21/20 173 lb 1 oz (78.5 kg)   12/15/20 173 lb (78.5 kg)     Body mass index is 28.19 kg/m².     CBC:   Lab Results   Component Value Date with the patient the risks and benefits of PIV, general anesthesia, IV Narcotics, PACU. All questions were answered the patient agrees with the plan and wishes to proceed.  )  Induction: intravenous. Pre-Operative Diagnosis: CATARACT RIGHT EYE    78 y.o.   BMI:  Body mass index is 27.88 kg/m².      Vitals:    05/10/21 1509 05/14/21 0728   BP:  118/67   Pulse:  51   Resp:  18   Temp:  97 °F (36.1 °C)   TempSrc:  Infrared   SpO2:  96%   Weight: 180 lb (81.6 kg) 178 lb (80.7 kg)   Height: 5' 7\" (1.702 m) 5' 7\" (1.702 m)       No Known Allergies    Social History     Tobacco Use    Smoking status: Never Smoker    Smokeless tobacco: Never Used   Substance Use Topics    Alcohol use: No       LABS:    CBC  Lab Results   Component Value Date/Time    WBC 4.9 12/21/2020 02:56 PM    HGB 12.2 12/21/2020 02:56 PM    HCT 37.2 12/21/2020 02:56 PM     12/21/2020 02:56 PM     RENAL  Lab Results   Component Value Date/Time     (L) 11/24/2020 01:35 PM    K 3.8 11/24/2020 01:35 PM    CL 97 (L) 11/24/2020 01:35 PM    CO2 28 11/24/2020 01:35 PM    BUN 15 11/24/2020 01:35 PM    CREATININE 0.8 11/24/2020 01:35 PM    GLUCOSE 88 11/24/2020 01:35 PM     COAGS  Lab Results   Component Value Date/Time    PROTIME 11.7 12/17/2012 05:22 PM    INR 1.03 12/17/2012 05:22 PM         Hien Duggan MD   5/13/2021

## 2021-05-14 NOTE — H&P
Surgical Service History and Physical    CHIEF COMPLAINT:   Decreased Vision and Glare    Reason for Admission:  Cataract Surgery    History Obtained From:  Patient    HISTORY OF PRESENT ILLNESS:  Pt has noticed painless progressive loss of vision and is experiencing functional difficulty. Past Medical History:        Diagnosis Date    Arthritis     Brain aneurysm     Hypertension     Neuropathy     PONV (postoperative nausea and vomiting)     after colonoscopy       Past Surgical History:        Procedure Laterality Date    BRAIN ANEURYSM SURGERY      BUNIONECTOMY Left 03/08/2018    COLONOSCOPY  2013    INTRACAPSULAR CATARACT EXTRACTION Left 4/23/2021    PHACO EMULSIFICATION OF CATARACT WITH INTRAOCULAR LENS IMPLANT EYE performed by Sandra Simmons MD at [address] HISTORY       PHACO EMULSIFICATION OF CATARACT WITH INTRAOCULAR LENS IMPLANT EYE (Left Eye)    UPPER GASTROINTESTINAL ENDOSCOPY  28 Dec 2015    polyps bx       Allergies:  Patient has no known allergies. Prior to Admission medications    Medication Sig Start Date End Date Taking? Authorizing Provider   traMADol (ULTRAM) 50 MG tablet Take 50 mg by mouth every 6 hours as needed for Pain. Yes Historical Provider, MD   celecoxib (CELEBREX) 200 MG capsule Take 200 mg by mouth daily 9/4/20  Yes Historical Provider, MD   tiZANidine (ZANAFLEX) 4 MG tablet Take 4 mg by mouth every 6 hours as needed  10/21/20  Yes Historical Provider, MD   LOSARTAN POTASSIUM PO Take by mouth   Yes Historical Provider, MD   gabapentin (NEURONTIN) 300 MG capsule Take 300 mg by mouth 2 times daily    Yes Historical Provider, MD   doxepin (SINEQUAN) 25 MG capsule Take 25 mg by mouth 10/9/13  Yes Historical Provider, MD   LORazepam (ATIVAN) 0.5 MG tablet Take 0.5 mg by mouth every 6 hours as needed for Anxiety   Yes Historical Provider, MD   Loratadine (CLARITIN PO) Take  by mouth.    Yes Historical Provider, MD   aspirin 81 MG tablet Take 81 mg by mouth daily. Yes Historical Provider, MD   Zolpidem Tartrate (AMBIEN PO) Take 5 mg by mouth nightly    Yes Historical Provider, MD         REVIEW OF SYSTEMS:    CONSTITUTIONAL:  negative  EYES: negative  HEENT:  negative  RESPIRATORY:  negative  CARDIOVASCULAR:  negative  MUSCULOSKELETAL:  negative  NEUROLOGICAL:  negative    PHYSICAL EXAM:    VITALS:  /67   Pulse 51   Temp 97 °F (36.1 °C) (Infrared)   Resp 18   Ht 5' 7\" (1.702 m)   Wt 178 lb (80.7 kg)   SpO2 96%   BMI 27.88 kg/m²   TEMPERATURE:  Current - Temp: 97 °F (36.1 °C); Max - Temp  Av °F (36.1 °C)  Min: 97 °F (36.1 °C)  Max: 97 °F (36.1 °C)  RESPIRATIONS RANGE: Resp  Av  Min: 18  Max: 18  PULSE RANGE: Pulse  Av  Min: 51  Max: 51  BLOOD PRESSURE RANGE:  Systolic (99XYG), AQU:649 , Min:118 , TJW:506   ; Diastolic (95JRU), KJV:85, Min:67, Max:67    CONSTITUTIONAL:  awake, alert, cooperative, no apparent distress, and appears stated age  EYES:  Lids and lashes normal, pupils equal, round and reactive to light, extra ocular muscles intact, sclera clear, conjunctiva normal  ENT:  Normocephalic, without obvious abnormality, atramatic, sinuses nontender on palpation, external ears without lesions, oral pharynx with moist mucus membranes, tonsils without erythema or exudates, gums normal and good dentition. NECK:  Supple, symmetrical, trachea midline, no adenopathy, thyroid symmetric, not enlarged and no tenderness, skin normal  LUNGS:  No increased work of breathing, good air exchange, clear to auscultation bilaterally, no crackles or wheezing  CARDIOVASCULAR:  Normal apical impulse, regular rate and rhythm, normal S1 and S2, no S3 or S4, and no murmur noted  ABDOMEN:  No scars, normal bowel sounds, soft, non-distended, non-tender, no masses palpated, no hepatosplenomegally  MUSCULOSKELETAL:  There is no redness, warmth, or swelling of the joints. Full range of motion noted.   Motor strength is 5 out of 5 all extremities bilaterally. Tone is normal.  NEUROLOGIC:  Awake, alert, oriented to name, place and time. Cranial nerves II-XII are grossly intact. Motor is 5 out of 5 bilaterally. Cerebellar finger to nose, heel to shin intact. Sensory is intact.   Babinski down going, Romberg negative, and gait is normal.      Impression: Visually Significant Cataract    Plan: [place] 53

## 2024-06-25 ENCOUNTER — CONSULT (OUTPATIENT)
Dept: ONCOLOGY | Facility: CLINIC | Age: 79
End: 2024-06-25
Payer: MEDICARE

## 2024-06-25 ENCOUNTER — LAB (OUTPATIENT)
Dept: LAB | Facility: HOSPITAL | Age: 79
End: 2024-06-25
Payer: MEDICARE

## 2024-06-25 VITALS
TEMPERATURE: 98.2 F | BODY MASS INDEX: 28.61 KG/M2 | HEART RATE: 57 BPM | OXYGEN SATURATION: 93 % | RESPIRATION RATE: 18 BRPM | HEIGHT: 66 IN | SYSTOLIC BLOOD PRESSURE: 180 MMHG | WEIGHT: 178 LBS | DIASTOLIC BLOOD PRESSURE: 82 MMHG

## 2024-06-25 DIAGNOSIS — D50.8 OTHER IRON DEFICIENCY ANEMIA: ICD-10-CM

## 2024-06-25 DIAGNOSIS — K90.9 MALABSORPTION OF IRON: Primary | ICD-10-CM

## 2024-06-25 PROBLEM — D50.9 IRON DEFICIENCY ANEMIA: Status: ACTIVE | Noted: 2024-06-25

## 2024-06-25 LAB
BASOPHILS # BLD AUTO: 0.01 10*3/MM3 (ref 0–0.2)
BASOPHILS NFR BLD AUTO: 0.2 % (ref 0–1.5)
DEPRECATED RDW RBC AUTO: 49.3 FL (ref 37–54)
EOSINOPHIL # BLD AUTO: 0.16 10*3/MM3 (ref 0–0.4)
EOSINOPHIL NFR BLD AUTO: 2.8 % (ref 0.3–6.2)
ERYTHROCYTE [DISTWIDTH] IN BLOOD BY AUTOMATED COUNT: 19 % (ref 12.3–15.4)
FERRITIN SERPL-MCNC: 8.28 NG/ML (ref 13–150)
HCT VFR BLD AUTO: 32.9 % (ref 34–46.6)
HGB BLD-MCNC: 9.5 G/DL (ref 12–15.9)
IMM GRANULOCYTES # BLD AUTO: 0 10*3/MM3 (ref 0–0.05)
IMM GRANULOCYTES NFR BLD AUTO: 0 % (ref 0–0.5)
IRON 24H UR-MRATE: 21 MCG/DL (ref 37–145)
IRON SATN MFR SERPL: 4 % (ref 20–50)
LYMPHOCYTES # BLD AUTO: 1.44 10*3/MM3 (ref 0.7–3.1)
LYMPHOCYTES NFR BLD AUTO: 25.2 % (ref 19.6–45.3)
MCH RBC QN AUTO: 20.6 PG (ref 26.6–33)
MCHC RBC AUTO-ENTMCNC: 28.9 G/DL (ref 31.5–35.7)
MCV RBC AUTO: 71.2 FL (ref 79–97)
MONOCYTES # BLD AUTO: 0.42 10*3/MM3 (ref 0.1–0.9)
MONOCYTES NFR BLD AUTO: 7.4 % (ref 5–12)
NEUTROPHILS NFR BLD AUTO: 3.68 10*3/MM3 (ref 1.7–7)
NEUTROPHILS NFR BLD AUTO: 64.4 % (ref 42.7–76)
PLATELET # BLD AUTO: 300 10*3/MM3 (ref 140–450)
PMV BLD AUTO: 8.5 FL (ref 6–12)
RBC # BLD AUTO: 4.62 10*6/MM3 (ref 3.77–5.28)
TIBC SERPL-MCNC: 548 MCG/DL (ref 298–536)
TRANSFERRIN SERPL-MCNC: 368 MG/DL (ref 200–360)
WBC NRBC COR # BLD AUTO: 5.71 10*3/MM3 (ref 3.4–10.8)

## 2024-06-25 PROCEDURE — 36415 COLL VENOUS BLD VENIPUNCTURE: CPT

## 2024-06-25 PROCEDURE — 1126F AMNT PAIN NOTED NONE PRSNT: CPT | Performed by: INTERNAL MEDICINE

## 2024-06-25 PROCEDURE — 85025 COMPLETE CBC W/AUTO DIFF WBC: CPT

## 2024-06-25 PROCEDURE — 84466 ASSAY OF TRANSFERRIN: CPT

## 2024-06-25 PROCEDURE — 83540 ASSAY OF IRON: CPT

## 2024-06-25 PROCEDURE — 99204 OFFICE O/P NEW MOD 45 MIN: CPT | Performed by: INTERNAL MEDICINE

## 2024-06-25 PROCEDURE — 82728 ASSAY OF FERRITIN: CPT

## 2024-06-25 RX ORDER — ACETAMINOPHEN 325 MG/1
650 TABLET ORAL ONCE
Status: CANCELLED | OUTPATIENT
Start: 2024-08-20

## 2024-06-25 RX ORDER — LOSARTAN POTASSIUM AND HYDROCHLOROTHIAZIDE 12.5; 5 MG/1; MG/1
1 TABLET ORAL 2 TIMES DAILY
COMMUNITY
Start: 2024-05-17

## 2024-06-25 RX ORDER — ZOLPIDEM TARTRATE 10 MG/1
10 TABLET ORAL
COMMUNITY
Start: 2024-05-13

## 2024-06-25 RX ORDER — MELATONIN
1000 DAILY
COMMUNITY

## 2024-06-25 RX ORDER — SODIUM CHLORIDE 9 MG/ML
20 INJECTION, SOLUTION INTRAVENOUS ONCE
OUTPATIENT
Start: 2024-08-06

## 2024-06-25 RX ORDER — SODIUM CHLORIDE 9 MG/ML
20 INJECTION, SOLUTION INTRAVENOUS ONCE
Status: CANCELLED | OUTPATIENT
Start: 2024-08-20

## 2024-06-25 RX ORDER — ACETAMINOPHEN 325 MG/1
650 TABLET ORAL ONCE
OUTPATIENT
Start: 2024-07-23

## 2024-06-25 RX ORDER — CELECOXIB 200 MG/1
200 CAPSULE ORAL DAILY
COMMUNITY
Start: 2024-03-17

## 2024-06-25 RX ORDER — SERTRALINE HYDROCHLORIDE 100 MG/1
100 TABLET, FILM COATED ORAL DAILY
COMMUNITY
Start: 2024-06-05

## 2024-06-25 RX ORDER — PANTOPRAZOLE SODIUM 40 MG/1
40 TABLET, DELAYED RELEASE ORAL DAILY
COMMUNITY
Start: 2024-06-05

## 2024-06-25 RX ORDER — ACETAMINOPHEN 325 MG/1
650 TABLET ORAL ONCE
OUTPATIENT
Start: 2024-08-06

## 2024-06-25 RX ORDER — SODIUM CHLORIDE 9 MG/ML
20 INJECTION, SOLUTION INTRAVENOUS ONCE
Status: CANCELLED | OUTPATIENT
Start: 2024-08-13

## 2024-06-25 RX ORDER — ACETAMINOPHEN 325 MG/1
650 TABLET ORAL ONCE
OUTPATIENT
Start: 2024-07-30

## 2024-06-25 RX ORDER — SODIUM CHLORIDE 9 MG/ML
20 INJECTION, SOLUTION INTRAVENOUS ONCE
OUTPATIENT
Start: 2024-07-16

## 2024-06-25 RX ORDER — ACETAMINOPHEN 325 MG/1
650 TABLET ORAL ONCE
OUTPATIENT
Start: 2024-07-02

## 2024-06-25 RX ORDER — ACETAMINOPHEN 325 MG/1
650 TABLET ORAL ONCE
OUTPATIENT
Start: 2024-07-09

## 2024-06-25 RX ORDER — DOXEPIN HYDROCHLORIDE 25 MG/1
25 CAPSULE ORAL NIGHTLY
COMMUNITY

## 2024-06-25 RX ORDER — TIZANIDINE 4 MG/1
4 TABLET ORAL 3 TIMES DAILY
COMMUNITY
Start: 2024-06-05

## 2024-06-25 RX ORDER — ACETAMINOPHEN 325 MG/1
650 TABLET ORAL ONCE
Status: CANCELLED | OUTPATIENT
Start: 2024-08-13

## 2024-06-25 RX ORDER — TRAMADOL HYDROCHLORIDE 50 MG/1
50 TABLET ORAL EVERY 6 HOURS PRN
COMMUNITY

## 2024-06-25 RX ORDER — GABAPENTIN 300 MG/1
300 CAPSULE ORAL 2 TIMES DAILY
COMMUNITY

## 2024-06-25 RX ORDER — SODIUM CHLORIDE 9 MG/ML
20 INJECTION, SOLUTION INTRAVENOUS ONCE
OUTPATIENT
Start: 2024-07-02

## 2024-06-25 RX ORDER — SODIUM CHLORIDE 9 MG/ML
20 INJECTION, SOLUTION INTRAVENOUS ONCE
OUTPATIENT
Start: 2024-07-09

## 2024-06-25 RX ORDER — ACETAMINOPHEN 325 MG/1
650 TABLET ORAL ONCE
OUTPATIENT
Start: 2024-07-16

## 2024-06-25 RX ORDER — SODIUM CHLORIDE 9 MG/ML
20 INJECTION, SOLUTION INTRAVENOUS ONCE
OUTPATIENT
Start: 2024-07-23

## 2024-06-25 RX ORDER — SODIUM CHLORIDE 9 MG/ML
20 INJECTION, SOLUTION INTRAVENOUS ONCE
OUTPATIENT
Start: 2024-07-30

## 2024-06-25 NOTE — PROGRESS NOTES
New Patient Office Visit      Date: 2024     Patient Name: Juany Noe  MRN: [id number]  : 1945  Referring Physician: Jasmeet Watts    Chief Complaint: Establish care for iron deficiency anemia    History of Present Illness: Juany Noe is a pleasant 79 y.o. female with a past medical history of arthritis, anxiety, hypertension, insomnia who presents today for evaluation of iron deficiency anemia. The patient was seen by other PCP in May 2020 for recheck labs which was notable for significant iron deficiency anemia.  Patient notes chronic fatigue but denies any worsening.  Has previously been on oral iron with intolerance due to GI upset.  She denies any cravings for ice or restless leg syndrome symptoms.  Denies any prior blood per rectum or melena.  Notes that she last had a colonoscopy about 5-6 years ago.  Scheduled for a knee replacement in the near future however this has been placed on hold due to her anemia    Oncology History:    Oncology/Hematology History    No history exists.       Subjective      Review of Systems:     Constitutional: Negative for fevers, chills, or weight loss  Eyes: Negative for blurred vision or discharge         Ear/Nose/Throat: Negative for difficulty swallowing, sore throat, LAD                                                       Respiratory: Negative for cough, SOA, wheezing                                                                                        Cardiovascular: Negative for chest pain or palpitations                                                                  Gastrointestinal: Negative for nausea, vomiting or diarrhea                                                                     Genitourinary: Negative for dysuria or hematuria                                                                                           Musculoskeletal: Negative for any joint pains or muscle aches                                                                         Neurologic: Negative for any weakness, headaches, dizziness                                                                         Hematologic: Negative for any easy bleeding or bruising                                                                                   Psychiatric: Negative for anxiety or depression                             Past Medical History: History reviewed. No pertinent past medical history.    Past Surgical History: History reviewed. No pertinent surgical history.    Family History: History reviewed. No pertinent family history.    Social History:   Social History     Socioeconomic History    Marital status:        Medications:     Current Outpatient Medications:     celecoxib (CeleBREX) 200 MG capsule, Take 1 capsule by mouth Daily., Disp: , Rfl:     cholecalciferol ( Vitamin D3) 25 MCG (1000 UT) tablet, Take 1 tablet by mouth Daily., Disp: , Rfl:     doxepin (SINEquan) 25 MG capsule, Take 1 capsule by mouth Every Night., Disp: , Rfl:     gabapentin (NEURONTIN) 300 MG capsule, Take 1 capsule by mouth 2 (Two) Times a Day., Disp: , Rfl:     losartan-hydrochlorothiazide (HYZAAR) 50-12.5 MG per tablet, Take 1 tablet by mouth 2 (Two) Times a Day., Disp: , Rfl:     pantoprazole (PROTONIX) 40 MG EC tablet, Take 1 tablet by mouth Daily., Disp: , Rfl:     sertraline (ZOLOFT) 100 MG tablet, Take 1 tablet by mouth Daily., Disp: , Rfl:     tiZANidine (ZANAFLEX) 4 MG tablet, Take 1 tablet by mouth 3 times a day., Disp: , Rfl:     traMADol (ULTRAM) 50 MG tablet, Take 1 tablet by mouth Every 6 (Six) Hours As Needed for Moderate Pain., Disp: , Rfl:     zolpidem (AMBIEN) 10 MG tablet, Take 1 tablet by mouth every night at bedtime., Disp: , Rfl:     Allergies:   Allergies   Allergen Reactions    Amoxicillin Diarrhea    Ciprofloxacin Diarrhea       Objective     Physical Exam:  Vital Signs:   Vitals:    06/25/24 1017   BP: 180/82   Pulse: 57   Resp: 18   Temp: 98.2 °F (36.8 °C)   SpO2: 93%  "  Weight: 80.7 kg (178 lb)   Height: 167.6 cm (66\")   PainSc: 0-No pain     Pain Score    06/25/24 1017   PainSc: 0-No pain     ECOG Performance Status: 0 - Asymptomatic    Constitutional: NAD, ECOG 0  Eyes: PERRLA, scleral anicteric  ENT: No LAD, no thyromegaly  Respiratory: CTAB, no wheezing, rales, rhonchi  Cardiovascular: RRR, no murmurs, pulses 2+ bilaterally  Abdomen: soft, NT/ND, no HSM  Musculoskeletal: strength 5/5 bilaterally, no c/c/e  Neurologic: A&O x 3, CN II-XII intact grossly  Psych: mood and affect congruent, no SI or HI    Results Review:   Lab on 06/25/2024   Component Date Value Ref Range Status    Ferritin 06/25/2024 8.28 (L)  13.00 - 150.00 ng/mL Final    Iron 06/25/2024 21 (L)  37 - 145 mcg/dL Final    Iron Saturation (TSAT) 06/25/2024 4 (L)  20 - 50 % Final    Transferrin 06/25/2024 368 (H)  200 - 360 mg/dL Final    TIBC 06/25/2024 548 (H)  298 - 536 mcg/dL Final    WBC 06/25/2024 5.71  3.40 - 10.80 10*3/mm3 Final    RBC 06/25/2024 4.62  3.77 - 5.28 10*6/mm3 Final    Hemoglobin 06/25/2024 9.5 (L)  12.0 - 15.9 g/dL Final    Hematocrit 06/25/2024 32.9 (L)  34.0 - 46.6 % Final    MCV 06/25/2024 71.2 (L)  79.0 - 97.0 fL Final    MCH 06/25/2024 20.6 (L)  26.6 - 33.0 pg Final    MCHC 06/25/2024 28.9 (L)  31.5 - 35.7 g/dL Final    RDW 06/25/2024 19.0 (H)  12.3 - 15.4 % Final    RDW-SD 06/25/2024 49.3  37.0 - 54.0 fl Final    MPV 06/25/2024 8.5  6.0 - 12.0 fL Final    Platelets 06/25/2024 300  140 - 450 10*3/mm3 Final    Neutrophil % 06/25/2024 64.4  42.7 - 76.0 % Final    Lymphocyte % 06/25/2024 25.2  19.6 - 45.3 % Final    Monocyte % 06/25/2024 7.4  5.0 - 12.0 % Final    Eosinophil % 06/25/2024 2.8  0.3 - 6.2 % Final    Basophil % 06/25/2024 0.2  0.0 - 1.5 % Final    Immature Grans % 06/25/2024 0.0  0.0 - 0.5 % Final    Neutrophils, Absolute 06/25/2024 3.68  1.70 - 7.00 10*3/mm3 Final    Lymphocytes, Absolute 06/25/2024 1.44  0.70 - 3.10 10*3/mm3 Final    Monocytes, Absolute 06/25/2024 0.42  0.10 - " 0.90 10*3/mm3 Final    Eosinophils, Absolute 06/25/2024 0.16  0.00 - 0.40 10*3/mm3 Final    Basophils, Absolute 06/25/2024 0.01  0.00 - 0.20 10*3/mm3 Final    Immature Grans, Absolute 06/25/2024 0.00  0.00 - 0.05 10*3/mm3 Final       No results found.    Assessment / Plan      Assessment/Plan:   1. Malabsorption of iron (Primary)/2. Other iron deficiency anemia  -Iron studies in May 2024 consistent with iron deficiency anemia  -Previously on oral iron but intolerant due to GI upset  -Repeat iron studies in June 2024 consistent with iron deficiency  -Plan to initiate IV iron with Ferrlecit x 6 weekly doses.  Plan to complete treatment at [facility] due to her travel concerns  -Last colonoscopy 5-6 years ago.  May need to consider repeat if her iron levels do not improve with infusion      Follow Up:   Follow-up in 2 months     Teddy Stoll MD  Hematology and Oncology     Please note that portions of this note may have been completed with a voice recognition program. Efforts were made to edit the dictations, but occasionally words are mistranscribed.

## 2024-06-26 ENCOUNTER — TELEPHONE (OUTPATIENT)
Dept: ONCOLOGY | Facility: CLINIC | Age: 79
End: 2024-06-26
Payer: MEDICARE

## 2024-06-26 NOTE — TELEPHONE ENCOUNTER
Caller: HasmukhDeann abbasi    Relationship: Self    Best call back number: [phone]    What was the call regarding: DEANN CALLED REGARDING HER IRON INFUSIONS. SHE WOULD LIKE TO HAVE THOSE DONE AT [facility] INSTEAD OF [facility]. THE ORDERS NEED TO BE FAXED [phone] WITH ATTENTION TO DISHA CAMILO.  PLEASE CALL TO DISCUSS.

## 2024-06-26 NOTE — TELEPHONE ENCOUNTER
Return call to [facility].  Advised orders have been faxed to [facility]. [facility] understood

## 2024-06-27 ENCOUNTER — TELEPHONE (OUTPATIENT)
Dept: ONCOLOGY | Facility: CLINIC | Age: 79
End: 2024-06-27
Payer: MEDICARE

## 2024-06-27 NOTE — TELEPHONE ENCOUNTER
[facility] called and state that they are unable to infuse Ferrlecit as they do not carry it.  [facility] cannot take the orders because Dr Stoll does not have admitting privileges. Caridad Cavanaugh will authorize and schedule with Juany.  Notified Juany, states understood

## 2024-07-01 ENCOUNTER — TELEPHONE (OUTPATIENT)
Dept: ONCOLOGY | Facility: CLINIC | Age: 79
End: 2024-07-01
Payer: MEDICARE

## 2024-07-01 NOTE — TELEPHONE ENCOUNTER
Caller: Juany Noe    Relationship: Self    Best call back number: [phone]    What is the best time to reach you: ANYTIME    Who are you requesting to speak with (clinical staff, provider,  specific staff member): SCHEDULING    What was the call regarding: PT ASKING WHY SHE HAS NOT BEEN SCHEDULED FOR INFUSIONS YET AND IF THEY CAN BE SCHEDULED AT [place].    PLEASE CALL TO ADVISE

## 2024-07-01 NOTE — TELEPHONE ENCOUNTER
Called Juany back and discussed that there was a note from Dr. Bud Menjivar nurse, on 6/28  that states it will be scheduled at [facility]. Patient states she had not heard from them to schedule. Called and spoke with Amara at [facility] outpatient infusion [phone] and she states the authorization is good so they will contact patient to get her scheduled. Called patient back and left VM to inform her she would be contacted.

## 2024-07-08 ENCOUNTER — INFUSION (OUTPATIENT)
Dept: ONCOLOGY | Facility: HOSPITAL | Age: 79
End: 2024-07-08
Payer: MEDICARE

## 2024-07-08 VITALS
OXYGEN SATURATION: 94 % | HEART RATE: 60 BPM | DIASTOLIC BLOOD PRESSURE: 80 MMHG | TEMPERATURE: 98 F | BODY MASS INDEX: 28.28 KG/M2 | WEIGHT: 175.2 LBS | SYSTOLIC BLOOD PRESSURE: 139 MMHG | RESPIRATION RATE: 18 BRPM

## 2024-07-08 DIAGNOSIS — K90.9 MALABSORPTION OF IRON: ICD-10-CM

## 2024-07-08 DIAGNOSIS — D50.8 OTHER IRON DEFICIENCY ANEMIA: Primary | ICD-10-CM

## 2024-07-08 PROCEDURE — A9270 NON-COVERED ITEM OR SERVICE: HCPCS | Performed by: INTERNAL MEDICINE

## 2024-07-08 PROCEDURE — 25010000002 NA FERRIC GLUC CPLX PER 12.5 MG: Performed by: INTERNAL MEDICINE

## 2024-07-08 PROCEDURE — 96365 THER/PROPH/DIAG IV INF INIT: CPT

## 2024-07-08 PROCEDURE — 63710000001 ACETAMINOPHEN 325 MG TABLET: Performed by: INTERNAL MEDICINE

## 2024-07-08 PROCEDURE — 25810000003 SODIUM CHLORIDE 0.9 % SOLUTION: Performed by: INTERNAL MEDICINE

## 2024-07-08 PROCEDURE — 25010000002 DIPHENHYDRAMINE PER 50 MG: Performed by: INTERNAL MEDICINE

## 2024-07-08 PROCEDURE — 96375 TX/PRO/DX INJ NEW DRUG ADDON: CPT

## 2024-07-08 RX ORDER — SODIUM CHLORIDE 9 MG/ML
20 INJECTION, SOLUTION INTRAVENOUS ONCE
Status: COMPLETED | OUTPATIENT
Start: 2024-07-08 | End: 2024-07-08

## 2024-07-08 RX ORDER — ACETAMINOPHEN 325 MG/1
650 TABLET ORAL ONCE
Status: COMPLETED | OUTPATIENT
Start: 2024-07-08 | End: 2024-07-08

## 2024-07-08 RX ADMIN — DIPHENHYDRAMINE HYDROCHLORIDE 25 MG: 50 INJECTION, SOLUTION INTRAMUSCULAR; INTRAVENOUS at 11:56

## 2024-07-08 RX ADMIN — SODIUM CHLORIDE 125 MG: 9 INJECTION, SOLUTION INTRAVENOUS at 12:29

## 2024-07-08 RX ADMIN — SODIUM CHLORIDE 20 ML/HR: 9 INJECTION, SOLUTION INTRAVENOUS at 11:56

## 2024-07-08 RX ADMIN — ACETAMINOPHEN 650 MG: 325 TABLET ORAL at 11:55

## 2024-07-15 ENCOUNTER — INFUSION (OUTPATIENT)
Dept: ONCOLOGY | Facility: HOSPITAL | Age: 79
End: 2024-07-15
Payer: MEDICARE

## 2024-07-15 VITALS
SYSTOLIC BLOOD PRESSURE: 125 MMHG | WEIGHT: 174.3 LBS | BODY MASS INDEX: 28.13 KG/M2 | RESPIRATION RATE: 16 BRPM | TEMPERATURE: 98 F | OXYGEN SATURATION: 95 % | HEART RATE: 53 BPM | DIASTOLIC BLOOD PRESSURE: 60 MMHG

## 2024-07-15 DIAGNOSIS — D50.8 OTHER IRON DEFICIENCY ANEMIA: Primary | ICD-10-CM

## 2024-07-15 DIAGNOSIS — K90.9 MALABSORPTION OF IRON: ICD-10-CM

## 2024-07-15 PROCEDURE — A9270 NON-COVERED ITEM OR SERVICE: HCPCS | Performed by: INTERNAL MEDICINE

## 2024-07-15 PROCEDURE — 96375 TX/PRO/DX INJ NEW DRUG ADDON: CPT

## 2024-07-15 PROCEDURE — 25010000002 NA FERRIC GLUC CPLX PER 12.5 MG: Performed by: INTERNAL MEDICINE

## 2024-07-15 PROCEDURE — 63710000001 ACETAMINOPHEN 325 MG TABLET: Performed by: INTERNAL MEDICINE

## 2024-07-15 PROCEDURE — 25810000003 SODIUM CHLORIDE 0.9 % SOLUTION: Performed by: INTERNAL MEDICINE

## 2024-07-15 PROCEDURE — 96365 THER/PROPH/DIAG IV INF INIT: CPT

## 2024-07-15 PROCEDURE — 96367 TX/PROPH/DG ADDL SEQ IV INF: CPT

## 2024-07-15 PROCEDURE — 25010000002 DIPHENHYDRAMINE PER 50 MG: Performed by: INTERNAL MEDICINE

## 2024-07-15 RX ORDER — ACETAMINOPHEN 325 MG/1
650 TABLET ORAL ONCE
Status: COMPLETED | OUTPATIENT
Start: 2024-07-15 | End: 2024-07-15

## 2024-07-15 RX ORDER — SODIUM CHLORIDE 9 MG/ML
20 INJECTION, SOLUTION INTRAVENOUS ONCE
Status: COMPLETED | OUTPATIENT
Start: 2024-07-15 | End: 2024-07-15

## 2024-07-15 RX ADMIN — ACETAMINOPHEN 650 MG: 325 TABLET ORAL at 14:01

## 2024-07-15 RX ADMIN — SODIUM CHLORIDE 20 ML/HR: 9 INJECTION, SOLUTION INTRAVENOUS at 14:03

## 2024-07-15 RX ADMIN — DIPHENHYDRAMINE HYDROCHLORIDE 25 MG: 50 INJECTION, SOLUTION INTRAMUSCULAR; INTRAVENOUS at 14:03

## 2024-07-15 RX ADMIN — SODIUM CHLORIDE 125 MG: 9 INJECTION, SOLUTION INTRAVENOUS at 14:24

## 2024-07-22 ENCOUNTER — INFUSION (OUTPATIENT)
Dept: ONCOLOGY | Facility: HOSPITAL | Age: 79
End: 2024-07-22
Payer: MEDICARE

## 2024-07-22 VITALS
WEIGHT: 180.8 LBS | HEART RATE: 61 BPM | DIASTOLIC BLOOD PRESSURE: 65 MMHG | RESPIRATION RATE: 18 BRPM | TEMPERATURE: 98.4 F | SYSTOLIC BLOOD PRESSURE: 116 MMHG | OXYGEN SATURATION: 90 % | BODY MASS INDEX: 29.18 KG/M2

## 2024-07-22 DIAGNOSIS — D50.8 OTHER IRON DEFICIENCY ANEMIA: Primary | ICD-10-CM

## 2024-07-22 DIAGNOSIS — K90.9 MALABSORPTION OF IRON: ICD-10-CM

## 2024-07-22 PROCEDURE — 63710000001 ACETAMINOPHEN 325 MG TABLET: Performed by: INTERNAL MEDICINE

## 2024-07-22 PROCEDURE — A9270 NON-COVERED ITEM OR SERVICE: HCPCS | Performed by: INTERNAL MEDICINE

## 2024-07-22 PROCEDURE — 96375 TX/PRO/DX INJ NEW DRUG ADDON: CPT

## 2024-07-22 PROCEDURE — 25810000003 SODIUM CHLORIDE 0.9 % SOLUTION: Performed by: INTERNAL MEDICINE

## 2024-07-22 PROCEDURE — 25010000002 NA FERRIC GLUC CPLX PER 12.5 MG: Performed by: INTERNAL MEDICINE

## 2024-07-22 PROCEDURE — 96365 THER/PROPH/DIAG IV INF INIT: CPT

## 2024-07-22 PROCEDURE — 25010000002 DIPHENHYDRAMINE PER 50 MG: Performed by: INTERNAL MEDICINE

## 2024-07-22 RX ORDER — ACETAMINOPHEN 325 MG/1
650 TABLET ORAL ONCE
Status: COMPLETED | OUTPATIENT
Start: 2024-07-22 | End: 2024-07-22

## 2024-07-22 RX ORDER — SODIUM CHLORIDE 9 MG/ML
20 INJECTION, SOLUTION INTRAVENOUS ONCE
Status: COMPLETED | OUTPATIENT
Start: 2024-07-22 | End: 2024-07-22

## 2024-07-22 RX ADMIN — DIPHENHYDRAMINE HYDROCHLORIDE 25 MG: 50 INJECTION, SOLUTION INTRAMUSCULAR; INTRAVENOUS at 11:14

## 2024-07-22 RX ADMIN — SODIUM CHLORIDE 125 MG: 9 INJECTION, SOLUTION INTRAVENOUS at 11:42

## 2024-07-22 RX ADMIN — ACETAMINOPHEN 650 MG: 325 TABLET ORAL at 11:14

## 2024-07-22 RX ADMIN — SODIUM CHLORIDE 20 ML/HR: 9 INJECTION, SOLUTION INTRAVENOUS at 11:42

## 2024-07-29 ENCOUNTER — INFUSION (OUTPATIENT)
Dept: ONCOLOGY | Facility: HOSPITAL | Age: 79
End: 2024-07-29
Payer: MEDICARE

## 2024-07-29 VITALS
SYSTOLIC BLOOD PRESSURE: 152 MMHG | OXYGEN SATURATION: 94 % | HEART RATE: 60 BPM | TEMPERATURE: 97.8 F | WEIGHT: 178.5 LBS | BODY MASS INDEX: 28.81 KG/M2 | DIASTOLIC BLOOD PRESSURE: 75 MMHG | RESPIRATION RATE: 18 BRPM

## 2024-07-29 DIAGNOSIS — K90.9 MALABSORPTION OF IRON: Primary | ICD-10-CM

## 2024-07-29 DIAGNOSIS — D50.8 OTHER IRON DEFICIENCY ANEMIA: ICD-10-CM

## 2024-07-29 PROCEDURE — 25810000003 SODIUM CHLORIDE 0.9 % SOLUTION: Performed by: INTERNAL MEDICINE

## 2024-07-29 PROCEDURE — 96375 TX/PRO/DX INJ NEW DRUG ADDON: CPT

## 2024-07-29 PROCEDURE — 25010000002 DIPHENHYDRAMINE PER 50 MG: Performed by: INTERNAL MEDICINE

## 2024-07-29 PROCEDURE — 96365 THER/PROPH/DIAG IV INF INIT: CPT

## 2024-07-29 PROCEDURE — A9270 NON-COVERED ITEM OR SERVICE: HCPCS | Performed by: INTERNAL MEDICINE

## 2024-07-29 PROCEDURE — 25010000002 NA FERRIC GLUC CPLX PER 12.5 MG: Performed by: INTERNAL MEDICINE

## 2024-07-29 PROCEDURE — 63710000001 ACETAMINOPHEN 325 MG TABLET: Performed by: INTERNAL MEDICINE

## 2024-07-29 RX ORDER — ACETAMINOPHEN 325 MG/1
650 TABLET ORAL ONCE
Status: COMPLETED | OUTPATIENT
Start: 2024-07-29 | End: 2024-07-29

## 2024-07-29 RX ORDER — SODIUM CHLORIDE 9 MG/ML
20 INJECTION, SOLUTION INTRAVENOUS ONCE
Status: COMPLETED | OUTPATIENT
Start: 2024-07-29 | End: 2024-07-29

## 2024-07-29 RX ADMIN — SODIUM CHLORIDE 125 MG: 9 INJECTION, SOLUTION INTRAVENOUS at 14:35

## 2024-07-29 RX ADMIN — DIPHENHYDRAMINE HYDROCHLORIDE 25 MG: 50 INJECTION, SOLUTION INTRAMUSCULAR; INTRAVENOUS at 14:16

## 2024-07-29 RX ADMIN — ACETAMINOPHEN 650 MG: 325 TABLET ORAL at 14:13

## 2024-07-29 RX ADMIN — SODIUM CHLORIDE 20 ML/HR: 9 INJECTION, SOLUTION INTRAVENOUS at 14:16

## 2024-08-05 ENCOUNTER — INFUSION (OUTPATIENT)
Dept: ONCOLOGY | Facility: HOSPITAL | Age: 79
End: 2024-08-05
Payer: MEDICARE

## 2024-08-05 VITALS
DIASTOLIC BLOOD PRESSURE: 58 MMHG | SYSTOLIC BLOOD PRESSURE: 108 MMHG | HEART RATE: 55 BPM | WEIGHT: 180.5 LBS | TEMPERATURE: 98.6 F | RESPIRATION RATE: 18 BRPM | BODY MASS INDEX: 29.13 KG/M2 | OXYGEN SATURATION: 94 %

## 2024-08-05 DIAGNOSIS — D50.8 OTHER IRON DEFICIENCY ANEMIA: Primary | ICD-10-CM

## 2024-08-05 DIAGNOSIS — K90.9 MALABSORPTION OF IRON: ICD-10-CM

## 2024-08-05 PROCEDURE — 96365 THER/PROPH/DIAG IV INF INIT: CPT

## 2024-08-05 PROCEDURE — 96375 TX/PRO/DX INJ NEW DRUG ADDON: CPT

## 2024-08-05 PROCEDURE — 25010000002 DIPHENHYDRAMINE PER 50 MG: Performed by: INTERNAL MEDICINE

## 2024-08-05 PROCEDURE — A9270 NON-COVERED ITEM OR SERVICE: HCPCS | Performed by: INTERNAL MEDICINE

## 2024-08-05 PROCEDURE — 63710000001 ACETAMINOPHEN 325 MG TABLET: Performed by: INTERNAL MEDICINE

## 2024-08-05 PROCEDURE — 25010000002 NA FERRIC GLUC CPLX PER 12.5 MG: Performed by: INTERNAL MEDICINE

## 2024-08-05 PROCEDURE — 25810000003 SODIUM CHLORIDE 0.9 % SOLUTION: Performed by: INTERNAL MEDICINE

## 2024-08-05 RX ORDER — SODIUM CHLORIDE 9 MG/ML
20 INJECTION, SOLUTION INTRAVENOUS ONCE
Status: COMPLETED | OUTPATIENT
Start: 2024-08-05 | End: 2024-08-05

## 2024-08-05 RX ORDER — ACETAMINOPHEN 325 MG/1
650 TABLET ORAL ONCE
Status: COMPLETED | OUTPATIENT
Start: 2024-08-05 | End: 2024-08-05

## 2024-08-05 RX ADMIN — ACETAMINOPHEN 650 MG: 325 TABLET ORAL at 14:19

## 2024-08-05 RX ADMIN — SODIUM CHLORIDE 20 ML/HR: 9 INJECTION, SOLUTION INTRAVENOUS at 14:19

## 2024-08-05 RX ADMIN — DIPHENHYDRAMINE HYDROCHLORIDE 25 MG: 50 INJECTION, SOLUTION INTRAMUSCULAR; INTRAVENOUS at 14:19

## 2024-08-05 RX ADMIN — SODIUM CHLORIDE 125 MG: 9 INJECTION, SOLUTION INTRAVENOUS at 14:49

## 2024-08-08 ENCOUNTER — TELEPHONE (OUTPATIENT)
Dept: ONCOLOGY | Facility: CLINIC | Age: 79
End: 2024-08-08
Payer: MEDICARE

## 2024-08-08 NOTE — TELEPHONE ENCOUNTER
Caller: Juany Noe    Relationship to patient: Self    Best call back number: [phone]    Chief complaint:     Type of visit: F/U 2     Requested date: 8/13/2024 CALL TO R/S     If rescheduling, when is the original appointment: 8/27/2024    Additional notes:

## 2024-08-12 ENCOUNTER — INFUSION (OUTPATIENT)
Dept: ONCOLOGY | Facility: HOSPITAL | Age: 79
End: 2024-08-12
Payer: MEDICARE

## 2024-08-12 VITALS
WEIGHT: 180.2 LBS | HEART RATE: 58 BPM | SYSTOLIC BLOOD PRESSURE: 114 MMHG | BODY MASS INDEX: 29.09 KG/M2 | DIASTOLIC BLOOD PRESSURE: 59 MMHG | RESPIRATION RATE: 18 BRPM | TEMPERATURE: 98.1 F | OXYGEN SATURATION: 96 %

## 2024-08-12 DIAGNOSIS — K90.9 MALABSORPTION OF IRON: ICD-10-CM

## 2024-08-12 DIAGNOSIS — D50.8 OTHER IRON DEFICIENCY ANEMIA: Primary | ICD-10-CM

## 2024-08-12 PROCEDURE — 63710000001 ACETAMINOPHEN 325 MG TABLET: Performed by: INTERNAL MEDICINE

## 2024-08-12 PROCEDURE — A9270 NON-COVERED ITEM OR SERVICE: HCPCS | Performed by: INTERNAL MEDICINE

## 2024-08-12 PROCEDURE — 25010000002 NA FERRIC GLUC CPLX PER 12.5 MG: Performed by: INTERNAL MEDICINE

## 2024-08-12 PROCEDURE — 96361 HYDRATE IV INFUSION ADD-ON: CPT

## 2024-08-12 PROCEDURE — 25010000002 DIPHENHYDRAMINE PER 50 MG: Performed by: INTERNAL MEDICINE

## 2024-08-12 PROCEDURE — 25810000003 SODIUM CHLORIDE 0.9 % SOLUTION: Performed by: INTERNAL MEDICINE

## 2024-08-12 PROCEDURE — 96365 THER/PROPH/DIAG IV INF INIT: CPT

## 2024-08-12 PROCEDURE — 96375 TX/PRO/DX INJ NEW DRUG ADDON: CPT

## 2024-08-12 RX ORDER — ACETAMINOPHEN 325 MG/1
650 TABLET ORAL ONCE
Status: COMPLETED | OUTPATIENT
Start: 2024-08-12 | End: 2024-08-12

## 2024-08-12 RX ORDER — SODIUM CHLORIDE 9 MG/ML
20 INJECTION, SOLUTION INTRAVENOUS ONCE
Status: COMPLETED | OUTPATIENT
Start: 2024-08-12 | End: 2024-08-12

## 2024-08-12 RX ADMIN — DIPHENHYDRAMINE HYDROCHLORIDE 25 MG: 50 INJECTION, SOLUTION INTRAMUSCULAR; INTRAVENOUS at 14:03

## 2024-08-12 RX ADMIN — SODIUM CHLORIDE 20 ML/HR: 9 INJECTION, SOLUTION INTRAVENOUS at 14:03

## 2024-08-12 RX ADMIN — SODIUM CHLORIDE 125 MG: 9 INJECTION, SOLUTION INTRAVENOUS at 14:31

## 2024-08-12 RX ADMIN — ACETAMINOPHEN 650 MG: 325 TABLET ORAL at 14:02

## 2024-08-13 ENCOUNTER — TELEPHONE (OUTPATIENT)
Dept: ONCOLOGY | Facility: CLINIC | Age: 79
End: 2024-08-13

## 2024-08-13 ENCOUNTER — OFFICE VISIT (OUTPATIENT)
Dept: ONCOLOGY | Facility: CLINIC | Age: 79
End: 2024-08-13
Payer: MEDICARE

## 2024-08-13 ENCOUNTER — LAB (OUTPATIENT)
Dept: LAB | Facility: HOSPITAL | Age: 79
End: 2024-08-13
Payer: MEDICARE

## 2024-08-13 VITALS
BODY MASS INDEX: 28.17 KG/M2 | DIASTOLIC BLOOD PRESSURE: 78 MMHG | WEIGHT: 179.5 LBS | SYSTOLIC BLOOD PRESSURE: 144 MMHG | HEIGHT: 67 IN | HEART RATE: 100 BPM | OXYGEN SATURATION: 97 % | TEMPERATURE: 97.1 F

## 2024-08-13 DIAGNOSIS — D50.8 OTHER IRON DEFICIENCY ANEMIA: ICD-10-CM

## 2024-08-13 DIAGNOSIS — K90.9 MALABSORPTION OF IRON: ICD-10-CM

## 2024-08-13 DIAGNOSIS — D50.8 OTHER IRON DEFICIENCY ANEMIA: Primary | ICD-10-CM

## 2024-08-13 LAB
BASOPHILS # BLD AUTO: 0.03 10*3/MM3 (ref 0–0.2)
BASOPHILS NFR BLD AUTO: 0.4 % (ref 0–1.5)
DEPRECATED RDW RBC AUTO: 71.8 FL (ref 37–54)
EOSINOPHIL # BLD AUTO: 0.12 10*3/MM3 (ref 0–0.4)
EOSINOPHIL NFR BLD AUTO: 1.5 % (ref 0.3–6.2)
ERYTHROCYTE [DISTWIDTH] IN BLOOD BY AUTOMATED COUNT: 25.6 % (ref 12.3–15.4)
FERRITIN SERPL-MCNC: 165 NG/ML (ref 13–150)
HCT VFR BLD AUTO: 38.4 % (ref 34–46.6)
HGB BLD-MCNC: 11.6 G/DL (ref 12–15.9)
IMM GRANULOCYTES # BLD AUTO: 0.01 10*3/MM3 (ref 0–0.05)
IMM GRANULOCYTES NFR BLD AUTO: 0.1 % (ref 0–0.5)
IRON 24H UR-MRATE: 140 MCG/DL (ref 37–145)
IRON SATN MFR SERPL: 32 % (ref 20–50)
LYMPHOCYTES # BLD AUTO: 1.41 10*3/MM3 (ref 0.7–3.1)
LYMPHOCYTES NFR BLD AUTO: 18.2 % (ref 19.6–45.3)
MCH RBC QN AUTO: 23.7 PG (ref 26.6–33)
MCHC RBC AUTO-ENTMCNC: 30.2 G/DL (ref 31.5–35.7)
MCV RBC AUTO: 78.5 FL (ref 79–97)
MONOCYTES # BLD AUTO: 0.52 10*3/MM3 (ref 0.1–0.9)
MONOCYTES NFR BLD AUTO: 6.7 % (ref 5–12)
NEUTROPHILS NFR BLD AUTO: 5.67 10*3/MM3 (ref 1.7–7)
NEUTROPHILS NFR BLD AUTO: 73.1 % (ref 42.7–76)
PLATELET # BLD AUTO: 288 10*3/MM3 (ref 140–450)
PMV BLD AUTO: 8.6 FL (ref 6–12)
RBC # BLD AUTO: 4.89 10*6/MM3 (ref 3.77–5.28)
TIBC SERPL-MCNC: 440 MCG/DL (ref 298–536)
TRANSFERRIN SERPL-MCNC: 295 MG/DL (ref 200–360)
WBC NRBC COR # BLD AUTO: 7.76 10*3/MM3 (ref 3.4–10.8)

## 2024-08-13 PROCEDURE — 84466 ASSAY OF TRANSFERRIN: CPT

## 2024-08-13 PROCEDURE — 85025 COMPLETE CBC W/AUTO DIFF WBC: CPT

## 2024-08-13 PROCEDURE — 99214 OFFICE O/P EST MOD 30 MIN: CPT | Performed by: INTERNAL MEDICINE

## 2024-08-13 PROCEDURE — 36415 COLL VENOUS BLD VENIPUNCTURE: CPT

## 2024-08-13 PROCEDURE — 1126F AMNT PAIN NOTED NONE PRSNT: CPT | Performed by: INTERNAL MEDICINE

## 2024-08-13 PROCEDURE — 83540 ASSAY OF IRON: CPT

## 2024-08-13 PROCEDURE — 82728 ASSAY OF FERRITIN: CPT

## 2024-08-13 NOTE — TELEPHONE ENCOUNTER
Call to Juany to notify iron studies okay today.  Will not need iron infusions.  Juany will call us back to give us contact information to send reports to.

## 2024-08-13 NOTE — TELEPHONE ENCOUNTER
Caller: Juany Noe    Relationship: Self    Best call back number: [phone]    What is the best time to reach you: ASAP    Who are you requesting to speak with (clinical staff, provider,  specific staff member): CLINICAL     What was the call regarding: P[facility]X OVER [facility] RES[facility] DR. BARRON, FAX NUMBER [phone].

## 2024-08-13 NOTE — PROGRESS NOTES
Follow Up Office Visit      Date: 2024     Patient Name: Juany Noe  MRN: [id number]  : 1945  Referring Physician: Jasmeet Watts     Chief Complaint: Follow-up for iron deficiency anemia     History of Present Illness: Juany Noe is a pleasant 79 y.o. female with a past medical history of arthritis, anxiety, hypertension, insomnia who presents today for evaluation of iron deficiency anemia. The patient was seen by other PCP in May 2020 for recheck labs which was notable for significant iron deficiency anemia.  Patient notes chronic fatigue but denies any worsening.  Has previously been on oral iron with intolerance due to GI upset.  She denies any cravings for ice or restless leg syndrome symptoms.  Denies any prior blood per rectum or melena.  Notes that she last had a colonoscopy about 5-6 years ago.  Scheduled for a knee replacement in the near future however this has been placed on hold due to her anemia    Interval History:  Presents to clinic for follow-up.  Completed IV Ferrlecit in 2024.  Tolerated the infusions well.  Notes some slight energy benefit.  Denies any other bleeding or bruising episodes    Oncology History:    Oncology/Hematology History    No history exists.       Subjective      Review of Systems:   Constitutional: Negative for fevers, chills, or weight loss  Eyes: Negative for blurred vision or discharge         Ear/Nose/Throat: Negative for difficulty swallowing, sore throat, LAD                                                       Respiratory: Negative for cough, SOA, wheezing                                                                                        Cardiovascular: Negative for chest pain or palpitations                                                                  Gastrointestinal: Negative for nausea, vomiting or diarrhea                                                                     Genitourinary: Negative for dysuria or hematuria                                                                                            Musculoskeletal: Negative for any joint pains or muscle aches                                                                        Neurologic: Negative for any weakness, headaches, dizziness                                                                         Hematologic: Negative for any easy bleeding or bruising                                                                                   Psychiatric: Negative for anxiety or depression                          Past Medical History/Past Surgical History/ Family History/ Social History: Reviewed by me and unchanged from my previous documentation done on June 2024.     Medications:     Current Outpatient Medications:     celecoxib (CeleBREX) 200 MG capsule, Take 1 capsule by mouth Daily., Disp: , Rfl:     cholecalciferol ( Vitamin D3) 25 MCG (1000 UT) tablet, Take 1 tablet by mouth Daily., Disp: , Rfl:     doxepin (SINEquan) 25 MG capsule, Take 1 capsule by mouth Every Night., Disp: , Rfl:     gabapentin (NEURONTIN) 300 MG capsule, Take 1 capsule by mouth 2 (Two) Times a Day., Disp: , Rfl:     losartan-hydrochlorothiazide (HYZAAR) 50-12.5 MG per tablet, Take 1 tablet by mouth 2 (Two) Times a Day., Disp: , Rfl:     pantoprazole (PROTONIX) 40 MG EC tablet, Take 1 tablet by mouth Daily., Disp: , Rfl:     sertraline (ZOLOFT) 100 MG tablet, Take 1 tablet by mouth Daily., Disp: , Rfl:     tiZANidine (ZANAFLEX) 4 MG tablet, Take 1 tablet by mouth 3 times a day., Disp: , Rfl:     traMADol (ULTRAM) 50 MG tablet, Take 1 tablet by mouth Every 6 (Six) Hours As Needed for Moderate Pain., Disp: , Rfl:     zolpidem (AMBIEN) 10 MG tablet, Take 1 tablet by mouth every night at bedtime., Disp: , Rfl:   No current facility-administered medications for this visit.    Allergies:   Allergies   Allergen Reactions    Amoxicillin Diarrhea    Ciprofloxacin Diarrhea       Objective     Physical Exam:  Vital  "Signs:   Vitals:    08/13/24 0909   BP: 144/78   Pulse: 100   Temp: 97.1 °F (36.2 °C)   TempSrc: Temporal   SpO2: 97%   Weight: 81.4 kg (179 lb 8 oz)   Height: 170.2 cm (67\")   PainSc: 0-No pain     Pain Score    08/13/24 0909   PainSc: 0-No pain     ECOG Performance Status: 0 - Asymptomatic    Constitutional: NAD, ECOG 0  Eyes: PERRLA, scleral anicteric  ENT: No LAD, no thyromegaly  Respiratory: CTAB, no wheezing, rales, rhonchi  Cardiovascular: RRR, no murmurs, pulses 2+ bilaterally  Abdomen: soft, NT/ND, no HSM  Musculoskeletal: strength 5/5 bilaterally, no c/c/e  Neurologic: A&O x 3, CN II-XII intact grossly    Results Review:   Lab on 08/13/2024   Component Date Value Ref Range Status    WBC 08/13/2024 7.76  3.40 - 10.80 10*3/mm3 Final    RBC 08/13/2024 4.89  3.77 - 5.28 10*6/mm3 Final    Hemoglobin 08/13/2024 11.6 (L)  12.0 - 15.9 g/dL Final    Hematocrit 08/13/2024 38.4  34.0 - 46.6 % Final    MCV 08/13/2024 78.5 (L)  79.0 - 97.0 fL Final    MCH 08/13/2024 23.7 (L)  26.6 - 33.0 pg Final    MCHC 08/13/2024 30.2 (L)  31.5 - 35.7 g/dL Final    RDW 08/13/2024 25.6 (H)  12.3 - 15.4 % Final    RDW-SD 08/13/2024 71.8 (H)  37.0 - 54.0 fl Final    MPV 08/13/2024 8.6  6.0 - 12.0 fL Final    Platelets 08/13/2024 288  140 - 450 10*3/mm3 Final    Neutrophil % 08/13/2024 73.1  42.7 - 76.0 % Final    Lymphocyte % 08/13/2024 18.2 (L)  19.6 - 45.3 % Final    Monocyte % 08/13/2024 6.7  5.0 - 12.0 % Final    Eosinophil % 08/13/2024 1.5  0.3 - 6.2 % Final    Basophil % 08/13/2024 0.4  0.0 - 1.5 % Final    Immature Grans % 08/13/2024 0.1  0.0 - 0.5 % Final    Neutrophils, Absolute 08/13/2024 5.67  1.70 - 7.00 10*3/mm3 Final    Lymphocytes, Absolute 08/13/2024 1.41  0.70 - 3.10 10*3/mm3 Final    Monocytes, Absolute 08/13/2024 0.52  0.10 - 0.90 10*3/mm3 Final    Eosinophils, Absolute 08/13/2024 0.12  0.00 - 0.40 10*3/mm3 Final    Basophils, Absolute 08/13/2024 0.03  0.00 - 0.20 10*3/mm3 Final    Immature Grans, Absolute 08/13/2024 " 0.01  0.00 - 0.05 10*3/mm3 Final       No results found.    Assessment / Plan      Assessment/Plan:   1. Malabsorption of iron (Primary)/2. Other iron deficiency anemia  -Iron studies in May 2024 consistent with iron deficiency anemia  -Previously on oral iron but intolerant due to GI upset  -Repeat iron studies in June 2024 consistent with iron deficiency  -Completed IV Ferrlecit in August 2024.  -Hemoglobin 11.6 today.  Iron studies pending  -Last colonoscopy 5-6 years ago.  Have advised patient to obtain where she had the procedure completed so we can obtain records         Follow Up:   Follow-up in 6 months or sooner if needed     Teddy Stoll MD  Hematology and Oncology     Please note that portions of this note may have been completed with a voice recognition program. Efforts were made to edit the dictations, but occasionally words are mistranscribed.

## 2025-04-01 ENCOUNTER — OFFICE VISIT (OUTPATIENT)
Dept: URGENT CARE | Age: 80
End: 2025-04-01

## 2025-04-01 VITALS
HEIGHT: 67 IN | SYSTOLIC BLOOD PRESSURE: 110 MMHG | TEMPERATURE: 98.9 F | HEART RATE: 71 BPM | BODY MASS INDEX: 27.28 KG/M2 | OXYGEN SATURATION: 97 % | WEIGHT: 173.8 LBS | DIASTOLIC BLOOD PRESSURE: 70 MMHG

## 2025-04-01 DIAGNOSIS — R10.13 EPIGASTRIC PAIN: Primary | ICD-10-CM

## 2025-04-01 DIAGNOSIS — S99.922A FOOT INJURY, LEFT, INITIAL ENCOUNTER: ICD-10-CM

## 2025-04-01 RX ORDER — PANTOPRAZOLE SODIUM 40 MG/1
40 TABLET, DELAYED RELEASE ORAL
Qty: 60 TABLET | Refills: 0 | Status: SHIPPED | OUTPATIENT
Start: 2025-04-01

## 2025-04-01 RX ORDER — PANTOPRAZOLE SODIUM 40 MG/1
1 TABLET, DELAYED RELEASE ORAL DAILY
COMMUNITY
Start: 2024-05-23

## 2025-04-01 RX ORDER — LOSARTAN POTASSIUM AND HYDROCHLOROTHIAZIDE 12.5; 5 MG/1; MG/1
1 TABLET ORAL DAILY
COMMUNITY
Start: 2024-05-17

## 2025-04-01 RX ORDER — SERTRALINE HYDROCHLORIDE 100 MG/1
100 TABLET, FILM COATED ORAL DAILY
COMMUNITY
Start: 2024-06-05

## 2025-04-01 RX ORDER — SULFAMETHOXAZOLE AND TRIMETHOPRIM 800; 160 MG/1; MG/1
1 TABLET ORAL 2 TIMES DAILY
COMMUNITY
Start: 2025-02-13

## 2025-04-01 RX ORDER — SUCRALFATE 1 G/1
1 TABLET ORAL 4 TIMES DAILY
COMMUNITY

## 2025-04-01 RX ORDER — IPRATROPIUM BROMIDE 21 UG/1
1 SPRAY, METERED NASAL
COMMUNITY

## 2025-04-01 RX ORDER — METOPROLOL SUCCINATE 50 MG/1
50 TABLET, EXTENDED RELEASE ORAL DAILY
COMMUNITY

## 2025-04-01 ASSESSMENT — ENCOUNTER SYMPTOMS
CONSTIPATION: 0
DIARRHEA: 0
ABDOMINAL PAIN: 1

## 2025-04-01 NOTE — PATIENT INSTRUCTIONS
Stop celebrex  Increase pantoprazole to 40 mg two times per day  Referral to GI  Referral to PCP  X ray ordered for tomorrow

## 2025-04-01 NOTE — PROGRESS NOTES
Linda Zaldivar (:  1945) is a 80 y.o. female,New patient, here for evaluation of the following chief complaint(s):  Abdominal Pain (Upper abdominal pain for several months, not improving with omeprazole or sucralfate. Previously treated for an ulcer.  ) and Foot Injury (patient injured right foot a week ago, bruising + pain with walking. )      ASSESSMENT/PLAN:    ICD-10-CM    1. Epigastric pain  R10.13 [facility]     pantoprazole (PROTONIX) 40 MG tablet     SHAMIR - Abdoulaye Reese MD, Gastroenterology, [facility]      2. Foot injury, left, initial encounter  S99.922A XR FOOT LEFT (MIN 3 VIEWS)        Epigastric/abdominal pain  Differential Peptic ulcer disease, gastritis, GERD, kc's esophagus  Stop the celebrex  Increase pantoprazole to 40 mg two times per day  Referred to Gastroenterology for an evaluation and possible EGD to determine source of abdominal pain  Last EGD at least 30 years ago  Foot injury  No recent Dexa scans  X ray ordered. Linda will call [place] urgent care tomorrow to find out what location has x ray.   Acetaminophen for pain as needed.  Patient/Family verbalized understanding of printed and verbal discharge instructions including follow up care.   Follow up with your primary care provider for persistent symptoms.   Follow up in 7 days if symptoms persist or if symptoms worsen.    SUBJECTIVE/OBJECTIVE:  Fell last Wednesday and still has bruising and swelling right foot. Also concerned about abdominal pain. History of abdominal pain. History of ulcers. Taking celebrex. Linda states it doesn't hurt more after eating. States her last EGD was at least 30 years ago. Moved from [place] and needs a new primary care provider.      History provided by:  Patient  Abdominal Pain  This is a recurrent problem. The current episode started more than 1 month ago. Pertinent negatives include no constipation or diarrhea.   Foot Injury

## 2025-04-02 ENCOUNTER — OFFICE VISIT (OUTPATIENT)
Dept: URGENT CARE | Age: 80
End: 2025-04-02

## 2025-04-02 DIAGNOSIS — S99.922A FOOT INJURY, LEFT, INITIAL ENCOUNTER: Primary | ICD-10-CM

## 2025-04-02 DIAGNOSIS — S92.351A CLOSED DISPLACED FRACTURE OF FIFTH METATARSAL BONE OF RIGHT FOOT, INITIAL ENCOUNTER: Primary | ICD-10-CM

## 2025-04-02 DIAGNOSIS — S92.352A DISPLACED FRACTURE OF FIFTH METATARSAL BONE, LEFT FOOT, INITIAL ENCOUNTER FOR CLOSED FRACTURE: ICD-10-CM

## 2025-04-02 NOTE — PROGRESS NOTES
Linda Zaldivar (: 1945) is a 80 y.o. female, Established patient, here for evaluation of the following chief complaint(s):  Clinic support visit (Clinic support visit for x-ray of the right foot with order provided yesterday from [facility])      ASSESSMENT/PLAN:    ICD-10-CM    1. Closed displaced fracture of fifth metatarsal bone of right foot, initial encounter  [facility] Walker Boot, Air Select Hi Top, Right (); MD (M7-10/F8-11)     [facility]     ORTHOPEDIC INJURY TREATMENT     APPLY LOWER LEG SPLINT          - Fracture of the Right 5th Metatarsal:  Yesterday's x-ray order was performed in clinic today:  Initial independent x-ray impression: Comminuted fracture of the right fifth metatarsal with slight displacement  Radiology impression:  Mildly displaced comminuted fracture of the distal portion of the 5th metatarsal.  X-ray findings confirm a fracture of the right fifth metatarsal  Low concern for dislocations, compartment syndrome, hematomas, blood clots, lymphedema, cellulitis, osteomyelitis, abrasions, lacerations, or neurovascular compromise.  Walking boot is applied in clinic to immobilize the injury  splint applied by RT Becca Carroll, as noted below in procedures, without complications and without evidence of neurovascular compromise as confirmed by the provider.  Recommend OTC pain meds per OTC packaging instructions.  RICE therapy, as discussed  Referral placed for orthopedic follow up to continue evaluation and treatment of the injury.    The patient tolerated their visit well. The patient was informed of the results of any tests. A time was given to answer questions and a plan was established, proposed, and was agreed upon. Reviewed AVS with treatment instructions and answered questions - patient acknowledges understanding and agreement with the discussed treatment plan and AVS  Stable

## 2025-04-02 NOTE — PATIENT INSTRUCTIONS
X-ray findings show a fracture of the 5th metatarsal (foot bone behind the right little toe)  Walking Boot applied in clinic to immobilize the injury  Continue with over the counter Tylenol for pain relief, per packaging instructions.  Be sure to avoid taking more than 4,000mg (4 grams) of acetaminophen (Tylenol), from all sources, in a 24 hour period.  Can apply a compressive ACE bandage over the joint. Rest and elevate the affected painful area.  Apply cold compresses intermittently as needed.  Cold compresses for 15-20 minutes, with 30-60 minutes between applications.  Follow up with the referral placed to orthopedics to continue evaluation and treatment of the injury.

## 2025-04-27 SDOH — HEALTH STABILITY: PHYSICAL HEALTH: ON AVERAGE, HOW MANY DAYS PER WEEK DO YOU ENGAGE IN MODERATE TO STRENUOUS EXERCISE (LIKE A BRISK WALK)?: 0 DAYS

## 2025-04-27 SDOH — HEALTH STABILITY: PHYSICAL HEALTH: ON AVERAGE, HOW MANY MINUTES DO YOU ENGAGE IN EXERCISE AT THIS LEVEL?: 0 MIN

## 2025-04-30 ENCOUNTER — OFFICE VISIT (OUTPATIENT)
Dept: PRIMARY CARE CLINIC | Age: 80
End: 2025-04-30
Payer: MEDICARE

## 2025-04-30 VITALS
DIASTOLIC BLOOD PRESSURE: 70 MMHG | HEIGHT: 67 IN | SYSTOLIC BLOOD PRESSURE: 118 MMHG | OXYGEN SATURATION: 94 % | WEIGHT: 175.2 LBS | BODY MASS INDEX: 27.5 KG/M2 | HEART RATE: 69 BPM | TEMPERATURE: 97.7 F

## 2025-04-30 DIAGNOSIS — Z78.0 POST-MENOPAUSAL: ICD-10-CM

## 2025-04-30 DIAGNOSIS — D50.8 OTHER IRON DEFICIENCY ANEMIA: ICD-10-CM

## 2025-04-30 DIAGNOSIS — K21.9 GASTROESOPHAGEAL REFLUX DISEASE, UNSPECIFIED WHETHER ESOPHAGITIS PRESENT: Primary | ICD-10-CM

## 2025-04-30 DIAGNOSIS — Z91.81 AT HIGH RISK FOR FALLS: ICD-10-CM

## 2025-04-30 DIAGNOSIS — G62.9 NEUROPATHY: ICD-10-CM

## 2025-04-30 DIAGNOSIS — F51.01 PRIMARY INSOMNIA: ICD-10-CM

## 2025-04-30 DIAGNOSIS — S92.354D CLOSED NONDISPLACED FRACTURE OF FIFTH METATARSAL BONE OF RIGHT FOOT WITH ROUTINE HEALING, SUBSEQUENT ENCOUNTER: ICD-10-CM

## 2025-04-30 DIAGNOSIS — L71.0 PERIORAL DERMATITIS: ICD-10-CM

## 2025-04-30 DIAGNOSIS — M79.672 LEFT FOOT PAIN: ICD-10-CM

## 2025-04-30 LAB
ALBUMIN SERPL-MCNC: 4.6 G/DL (ref 3.4–5)
ALBUMIN/GLOB SERPL: 1.9 {RATIO} (ref 1.1–2.2)
ALP SERPL-CCNC: 126 U/L (ref 40–129)
ALT SERPL-CCNC: 22 U/L (ref 10–40)
ANION GAP SERPL CALCULATED.3IONS-SCNC: 11 MMOL/L (ref 3–16)
AST SERPL-CCNC: 30 U/L (ref 15–37)
BILIRUB SERPL-MCNC: 0.4 MG/DL (ref 0–1)
BUN SERPL-MCNC: 17 MG/DL (ref 7–20)
CALCIUM SERPL-MCNC: 9.7 MG/DL (ref 8.3–10.6)
CHLORIDE SERPL-SCNC: 102 MMOL/L (ref 99–110)
CO2 SERPL-SCNC: 29 MMOL/L (ref 21–32)
CREAT SERPL-MCNC: 0.8 MG/DL (ref 0.6–1.2)
FERRITIN SERPL IA-MCNC: 20.5 NG/ML (ref 15–150)
GFR SERPLBLD CREATININE-BSD FMLA CKD-EPI: 75 ML/MIN/{1.73_M2}
GLUCOSE SERPL-MCNC: 92 MG/DL (ref 70–99)
IRON SATN MFR SERPL: 15 % (ref 15–50)
IRON SERPL-MCNC: 58 UG/DL (ref 37–145)
POTASSIUM SERPL-SCNC: 5.2 MMOL/L (ref 3.5–5.1)
PROT SERPL-MCNC: 7 G/DL (ref 6.4–8.2)
SODIUM SERPL-SCNC: 142 MMOL/L (ref 136–145)
TIBC SERPL-MCNC: 385 UG/DL (ref 260–445)
TSH SERPL DL<=0.005 MIU/L-ACNC: 2.09 UIU/ML (ref 0.27–4.2)

## 2025-04-30 PROCEDURE — 99205 OFFICE O/P NEW HI 60 MIN: CPT | Performed by: NURSE PRACTITIONER

## 2025-04-30 PROCEDURE — 1160F RVW MEDS BY RX/DR IN RCRD: CPT | Performed by: NURSE PRACTITIONER

## 2025-04-30 PROCEDURE — G2211 COMPLEX E/M VISIT ADD ON: HCPCS | Performed by: NURSE PRACTITIONER

## 2025-04-30 PROCEDURE — 1036F TOBACCO NON-USER: CPT | Performed by: NURSE PRACTITIONER

## 2025-04-30 PROCEDURE — 1159F MED LIST DOCD IN RCRD: CPT | Performed by: NURSE PRACTITIONER

## 2025-04-30 PROCEDURE — 36415 COLL VENOUS BLD VENIPUNCTURE: CPT | Performed by: NURSE PRACTITIONER

## 2025-04-30 PROCEDURE — 1090F PRES/ABSN URINE INCON ASSESS: CPT | Performed by: NURSE PRACTITIONER

## 2025-04-30 PROCEDURE — G8427 DOCREV CUR MEDS BY ELIG CLIN: HCPCS | Performed by: NURSE PRACTITIONER

## 2025-04-30 PROCEDURE — G8400 PT W/DXA NO RESULTS DOC: HCPCS | Performed by: NURSE PRACTITIONER

## 2025-04-30 PROCEDURE — 1123F ACP DISCUSS/DSCN MKR DOCD: CPT | Performed by: NURSE PRACTITIONER

## 2025-04-30 PROCEDURE — G8419 CALC BMI OUT NRM PARAM NOF/U: HCPCS | Performed by: NURSE PRACTITIONER

## 2025-04-30 RX ORDER — GABAPENTIN 400 MG/1
400 CAPSULE ORAL 2 TIMES DAILY
Qty: 180 CAPSULE | Refills: 0 | Status: SHIPPED | OUTPATIENT
Start: 2025-05-23 | End: 2025-06-22

## 2025-04-30 RX ORDER — METRONIDAZOLE 7.5 MG/G
GEL TOPICAL
Qty: 45 G | Refills: 0 | Status: SHIPPED | OUTPATIENT
Start: 2025-04-30

## 2025-04-30 RX ORDER — SUCRALFATE 1 G/1
1 TABLET ORAL 4 TIMES DAILY
Qty: 120 TABLET | Refills: 0 | Status: SHIPPED | OUTPATIENT
Start: 2025-04-30

## 2025-04-30 SDOH — ECONOMIC STABILITY: FOOD INSECURITY: WITHIN THE PAST 12 MONTHS, THE FOOD YOU BOUGHT JUST DIDN'T LAST AND YOU DIDN'T HAVE MONEY TO GET MORE.: NEVER TRUE

## 2025-04-30 SDOH — ECONOMIC STABILITY: FOOD INSECURITY: WITHIN THE PAST 12 MONTHS, YOU WORRIED THAT YOUR FOOD WOULD RUN OUT BEFORE YOU GOT MONEY TO BUY MORE.: NEVER TRUE

## 2025-04-30 ASSESSMENT — PATIENT HEALTH QUESTIONNAIRE - PHQ9
SUM OF ALL RESPONSES TO PHQ QUESTIONS 1-9: 0
SUM OF ALL RESPONSES TO PHQ QUESTIONS 1-9: 0
1. LITTLE INTEREST OR PLEASURE IN DOING THINGS: NOT AT ALL
SUM OF ALL RESPONSES TO PHQ QUESTIONS 1-9: 0
2. FEELING DOWN, DEPRESSED OR HOPELESS: NOT AT ALL
SUM OF ALL RESPONSES TO PHQ QUESTIONS 1-9: 0

## 2025-04-30 ASSESSMENT — ENCOUNTER SYMPTOMS
SHORTNESS OF BREATH: 0
COUGH: 0
ABDOMINAL PAIN: 1
WHEEZING: 0

## 2025-04-30 NOTE — PROGRESS NOTES
50-12.5 MG per tablet Take 1 tablet by mouth daily      metoprolol succinate (TOPROL XL) 50 MG extended release tablet Take 1 tablet by mouth daily      pantoprazole (PROTONIX) 40 MG tablet Take 1 tablet by mouth 2 times daily (before meals) 60 tablet 0    tiZANidine (ZANAFLEX) 4 MG tablet Take 1 tablet by mouth every 6 hours as needed      Zolpidem Tartrate (AMBIEN PO) Take 5 mg by mouth nightly       sulfamethoxazole-trimethoprim (BACTRIM DS;SEPTRA DS) 800-160 MG per tablet Take 1 tablet by mouth 2 times daily       No current facility-administered medications for this visit.     MEDICATION LIST REVIEWED AND UPDATED AT TIME OF VISIT       Review of Systems   Constitutional:  Negative for chills, fatigue and fever.   Respiratory:  Negative for cough, shortness of breath and wheezing.    Cardiovascular:  Negative for chest pain and leg swelling.   Gastrointestinal:  Positive for abdominal pain.   Musculoskeletal:         Foot pain    All other systems reviewed and are negative.      Vitals:    04/30/25 1044   BP: 118/70   BP Site: Right Upper Arm   Patient Position: Sitting   BP Cuff Size: Medium Adult   Pulse: 69   Temp: 97.7 °F (36.5 °C)   TempSrc: Temporal   SpO2: 94%   Weight: 79.5 kg (175 lb 3.2 oz)   Height: 1.702 m (5' 7.01\")       Physical Exam  Vitals reviewed.   Constitutional:       Appearance: Normal appearance.   Cardiovascular:      Rate and Rhythm: Normal rate and regular rhythm.      Pulses: Normal pulses.      Heart sounds: Normal heart sounds.   Pulmonary:      Effort: Pulmonary effort is normal.      Breath sounds: Normal breath sounds.   Feet:      Comments: Boot on right foot   Skin:     Capillary Refill: Capillary refill takes less than 2 seconds.   Neurological:      General: No focal deficit present.      Mental Status: She is alert and oriented to person, place, and time.   Psychiatric:         Mood and Affect: Mood normal.         Behavior: Behavior normal.         Thought Content: Thought

## 2025-05-01 ENCOUNTER — RESULTS FOLLOW-UP (OUTPATIENT)
Dept: PRIMARY CARE CLINIC | Age: 80
End: 2025-05-01

## 2025-05-22 DIAGNOSIS — F51.01 PRIMARY INSOMNIA: Primary | ICD-10-CM

## 2025-05-22 RX ORDER — ZOLPIDEM TARTRATE 10 MG/1
10 TABLET ORAL NIGHTLY
Qty: 30 TABLET | Refills: 1 | Status: SHIPPED | OUTPATIENT
Start: 2025-06-05 | End: 2025-08-04

## 2025-05-22 NOTE — TELEPHONE ENCOUNTER
Medication:   Requested Prescriptions      No prescriptions requested or ordered in this encounter        Last Filled:  Never- patient states she takes 10mg and wants #90 sent to mail order RX    Patient Phone Number: [phone] (home)     Last appt: 4/30/2025   Next appt: 8/4/2025    Last OARRS:        No data to display

## 2025-06-04 ENCOUNTER — HOSPITAL ENCOUNTER (OUTPATIENT)
Dept: WOMENS IMAGING | Age: 80
Discharge: HOME | End: 2025-06-04
Payer: MEDICARE

## 2025-06-04 DIAGNOSIS — Z78.0 POST-MENOPAUSAL: ICD-10-CM

## 2025-06-04 PROCEDURE — 77080 DXA BONE DENSITY AXIAL: CPT

## 2025-06-06 ENCOUNTER — RESULTS FOLLOW-UP (OUTPATIENT)
Dept: PRIMARY CARE CLINIC | Age: 80
End: 2025-06-06

## 2025-06-25 ENCOUNTER — OFFICE VISIT (OUTPATIENT)
Dept: PRIMARY CARE CLINIC | Age: 80
End: 2025-06-25
Payer: MEDICARE

## 2025-06-25 VITALS
OXYGEN SATURATION: 96 % | TEMPERATURE: 97.7 F | DIASTOLIC BLOOD PRESSURE: 79 MMHG | WEIGHT: 178.2 LBS | BODY MASS INDEX: 27.9 KG/M2 | SYSTOLIC BLOOD PRESSURE: 139 MMHG | HEART RATE: 60 BPM

## 2025-06-25 DIAGNOSIS — I10 PRIMARY HYPERTENSION: Primary | ICD-10-CM

## 2025-06-25 DIAGNOSIS — G44.209 ACUTE NON INTRACTABLE TENSION-TYPE HEADACHE: ICD-10-CM

## 2025-06-25 DIAGNOSIS — R05.1 ACUTE COUGH: ICD-10-CM

## 2025-06-25 DIAGNOSIS — G62.9 NEUROPATHY: ICD-10-CM

## 2025-06-25 DIAGNOSIS — R09.82 POST-NASAL DRIP: ICD-10-CM

## 2025-06-25 DIAGNOSIS — H91.90 HOH (HARD OF HEARING): ICD-10-CM

## 2025-06-25 DIAGNOSIS — M79.672 LEFT FOOT PAIN: ICD-10-CM

## 2025-06-25 PROCEDURE — 1036F TOBACCO NON-USER: CPT | Performed by: NURSE PRACTITIONER

## 2025-06-25 PROCEDURE — G8399 PT W/DXA RESULTS DOCUMENT: HCPCS | Performed by: NURSE PRACTITIONER

## 2025-06-25 PROCEDURE — 1159F MED LIST DOCD IN RCRD: CPT | Performed by: NURSE PRACTITIONER

## 2025-06-25 PROCEDURE — 3075F SYST BP GE 130 - 139MM HG: CPT | Performed by: NURSE PRACTITIONER

## 2025-06-25 PROCEDURE — 1160F RVW MEDS BY RX/DR IN RCRD: CPT | Performed by: NURSE PRACTITIONER

## 2025-06-25 PROCEDURE — 99214 OFFICE O/P EST MOD 30 MIN: CPT | Performed by: NURSE PRACTITIONER

## 2025-06-25 PROCEDURE — 1123F ACP DISCUSS/DSCN MKR DOCD: CPT | Performed by: NURSE PRACTITIONER

## 2025-06-25 PROCEDURE — 1090F PRES/ABSN URINE INCON ASSESS: CPT | Performed by: NURSE PRACTITIONER

## 2025-06-25 PROCEDURE — G8419 CALC BMI OUT NRM PARAM NOF/U: HCPCS | Performed by: NURSE PRACTITIONER

## 2025-06-25 PROCEDURE — 3078F DIAST BP <80 MM HG: CPT | Performed by: NURSE PRACTITIONER

## 2025-06-25 PROCEDURE — G8427 DOCREV CUR MEDS BY ELIG CLIN: HCPCS | Performed by: NURSE PRACTITIONER

## 2025-06-25 RX ORDER — FLUTICASONE PROPIONATE 50 MCG
2 SPRAY, SUSPENSION (ML) NASAL DAILY
Qty: 16 G | Refills: 5 | Status: SHIPPED | OUTPATIENT
Start: 2025-06-25

## 2025-06-25 RX ORDER — LOSARTAN POTASSIUM AND HYDROCHLOROTHIAZIDE 12.5; 1 MG/1; MG/1
1 TABLET ORAL DAILY
Qty: 90 TABLET | Refills: 0 | Status: SHIPPED | OUTPATIENT
Start: 2025-06-25

## 2025-06-25 RX ORDER — TRAMADOL HYDROCHLORIDE 50 MG/1
TABLET ORAL
COMMUNITY
Start: 2025-06-20

## 2025-06-25 RX ORDER — NAPROXEN SODIUM 220 MG/1
220 TABLET, FILM COATED ORAL 2 TIMES DAILY WITH MEALS
COMMUNITY

## 2025-06-25 RX ORDER — ECHINACEA PURPUREA EXTRACT 125 MG
TABLET ORAL
COMMUNITY

## 2025-06-25 RX ORDER — PREDNISONE 10 MG/1
1 TABLET ORAL DAILY
COMMUNITY

## 2025-06-25 RX ORDER — GABAPENTIN 400 MG/1
400 CAPSULE ORAL 2 TIMES DAILY
Qty: 180 CAPSULE | Refills: 0 | Status: SHIPPED | OUTPATIENT
Start: 2025-06-25 | End: 2025-07-25

## 2025-06-25 RX ORDER — BENZONATATE 100 MG/1
100-200 CAPSULE ORAL 3 TIMES DAILY PRN
Qty: 60 CAPSULE | Refills: 0 | Status: SHIPPED | OUTPATIENT
Start: 2025-06-25 | End: 2025-07-02

## 2025-06-25 NOTE — PROGRESS NOTES
Linda Zaldivar (:  1945) is a 80 y.o. female,Established patient, here for evaluation of the following chief complaint(s):  Hypertension, Cough (Dry cough- started a few weeks ago), and Headache (In the mornings/)      ASSESSMENT/PLAN:  1. Primary hypertension  -     losartan-hydroCHLOROthiazide (HYZAAR) 100-12.5 MG per tablet; Take 1 tablet by mouth daily, Disp-90 tablet, R-0Normal  2. Acute cough  -     benzonatate (TESSALON) 100 MG capsule; Take 1-2 capsules by mouth 3 times daily as needed for Cough, Disp-60 capsule, R-0Normal  3. Post-nasal drip  -     fluticasone (FLONASE) 50 MCG/ACT nasal spray; 2 sprays by Each Nostril route daily, Disp-16 g, R-5Normal  4. Acute non intractable tension-type headache  5. [place] (hard of hearing)  -     [place] - Olive Hoover, John,   [facility], [facility] [place]  6. Left foot pain  -     gabapentin (NEURONTIN) 400 MG capsule; Take 1 capsule by mouth 2 times daily for 30 days., Disp-180 capsule, R-0Normal  7. Neuropathy  -     gabapentin (NEURONTIN) 400 MG capsule; Take 1 capsule by mouth 2 times daily for 30 days., Disp-180 capsule, R-0Normal      Assessment & Plan  1. Hypertension.  - Elevated blood pressure readings at home, with systolic values ranging from 138 to 167 mmHg.  - Heart rate currently at 60 beats per minute.  - Dosage of losartan/hydrochlorothiazide increased to 100/12.5 mg; metoprolol not reintroduced due to potential heart rate reduction.  - Follow-up scheduled for the beginning of August to monitor blood pressure.    2. Cough.  - Persistent dry cough, occasionally disrupting sleep.  - Physical exam reveals drainage in the throat; no signs of infection in ears.  - Flonase nasal spray prescribed for nightly use to alleviate postnasal drip; Tessalon Perles prescribed for cough management.  - Advised to report if cough persists or new symptoms develop.    3. Headache.  - Morning headaches, possibly related to sinus issues rather than hypertension.  -

## 2025-06-26 ASSESSMENT — ENCOUNTER SYMPTOMS
COUGH: 1
SHORTNESS OF BREATH: 0
WHEEZING: 0

## 2025-06-27 NOTE — PROGRESS NOTES
Linda Zaldivar   1945, 80 y.o. female   [id number]       Referring Provider: Kaylin Rios APRN - CNP   Referral Type: In an order in Epic    Reason for Visit: Evaluation of suspected change in hearing, tinnitus, or balance.    ADULT AUDIOLOGIC EVALUATION      Linda Zaldivar is a 80 y.o. female seen today, 6/30/2025 , for an initial audiologic evaluation.      AUDIOLOGIC AND OTHER PERTINENT MEDICAL HISTORY:      Linda Zaldivar was seen for a baseline Audiogram. She notes difficulty hearing when speaking with her family and when watching TV. Patient reports she previously tried OTC hearing aids but was not satisfied with the comfort of fit or sound quality of the devices.     She denied otalgia, aural fullness, otorrhea, tinnitus, dizziness, imbalance, history of noise exposure, and family history of hearing loss    Date: 6/30/2025     IMPRESSIONS:      Today's results revealed a bilateral sensorineural hearing loss    Good speech understanding when in quiet. Tympanometry indicates Right Ear normal middle ear function and Left Ear normal middle ear function.    Discussed test results and implications with patient. Discussed possible benefits of amplification.  Discussed scheduling a HAE. Hearing aids recommended at this time.    Scheduled patient for a Hearing Aid Evaluation on 7/16/25 with Dr. Aydee Jolly, AuD.    Follow medical recommendations of Kaylin Rios APRN - CNP .    ASSESSMENT AND FINDINGS:     Otoscopy unremarkable.    RIGHT EAR:  Hearing Sensitivity: Normal sloping to Severe Sensorineural hearing loss  Speech Recognition Threshold: 40 dB HL  Word Recognition: Good 88%, based on NU-6 by-difficulty list at 80 dBHL with 50 dB HL masking noise using recorded speech stimuli.    Tympanometry: Normal peak pressure and compliance, Type A tympanogram, consistent with normal middle ear function.       LEFT EAR:  Hearing Sensitivity: Mild sloping to Severe Sensorineural hearing loss  Speech

## 2025-06-30 ENCOUNTER — PROCEDURE VISIT (OUTPATIENT)
Age: 80
End: 2025-06-30
Payer: MEDICARE

## 2025-06-30 DIAGNOSIS — H90.3 SENSORINEURAL HEARING LOSS (SNHL) OF BOTH EARS: Primary | ICD-10-CM

## 2025-06-30 PROCEDURE — 92567 TYMPANOMETRY: CPT

## 2025-06-30 PROCEDURE — 92557 COMPREHENSIVE HEARING TEST: CPT

## 2025-08-04 ENCOUNTER — OFFICE VISIT (OUTPATIENT)
Dept: PRIMARY CARE CLINIC | Age: 80
End: 2025-08-04
Payer: MEDICARE

## 2025-08-04 VITALS
WEIGHT: 184 LBS | DIASTOLIC BLOOD PRESSURE: 76 MMHG | TEMPERATURE: 97.7 F | SYSTOLIC BLOOD PRESSURE: 130 MMHG | OXYGEN SATURATION: 96 % | BODY MASS INDEX: 28.81 KG/M2 | HEART RATE: 56 BPM | RESPIRATION RATE: 18 BRPM

## 2025-08-04 DIAGNOSIS — M79.672 LEFT FOOT PAIN: ICD-10-CM

## 2025-08-04 DIAGNOSIS — R10.13 EPIGASTRIC PAIN: ICD-10-CM

## 2025-08-04 DIAGNOSIS — M19.90 ARTHRITIS: ICD-10-CM

## 2025-08-04 DIAGNOSIS — I10 PRIMARY HYPERTENSION: Primary | ICD-10-CM

## 2025-08-04 DIAGNOSIS — F33.0 MILD EPISODE OF RECURRENT MAJOR DEPRESSIVE DISORDER: ICD-10-CM

## 2025-08-04 DIAGNOSIS — G62.9 NEUROPATHY: ICD-10-CM

## 2025-08-04 DIAGNOSIS — F51.01 PRIMARY INSOMNIA: ICD-10-CM

## 2025-08-04 DIAGNOSIS — R09.82 POST-NASAL DRIP: ICD-10-CM

## 2025-08-04 PROCEDURE — G8427 DOCREV CUR MEDS BY ELIG CLIN: HCPCS | Performed by: NURSE PRACTITIONER

## 2025-08-04 PROCEDURE — 1123F ACP DISCUSS/DSCN MKR DOCD: CPT | Performed by: NURSE PRACTITIONER

## 2025-08-04 PROCEDURE — 3078F DIAST BP <80 MM HG: CPT | Performed by: NURSE PRACTITIONER

## 2025-08-04 PROCEDURE — 1090F PRES/ABSN URINE INCON ASSESS: CPT | Performed by: NURSE PRACTITIONER

## 2025-08-04 PROCEDURE — 99214 OFFICE O/P EST MOD 30 MIN: CPT | Performed by: NURSE PRACTITIONER

## 2025-08-04 PROCEDURE — 3074F SYST BP LT 130 MM HG: CPT | Performed by: NURSE PRACTITIONER

## 2025-08-04 PROCEDURE — G2211 COMPLEX E/M VISIT ADD ON: HCPCS | Performed by: NURSE PRACTITIONER

## 2025-08-04 PROCEDURE — 1159F MED LIST DOCD IN RCRD: CPT | Performed by: NURSE PRACTITIONER

## 2025-08-04 PROCEDURE — G8399 PT W/DXA RESULTS DOCUMENT: HCPCS | Performed by: NURSE PRACTITIONER

## 2025-08-04 PROCEDURE — 1036F TOBACCO NON-USER: CPT | Performed by: NURSE PRACTITIONER

## 2025-08-04 PROCEDURE — G8419 CALC BMI OUT NRM PARAM NOF/U: HCPCS | Performed by: NURSE PRACTITIONER

## 2025-08-04 RX ORDER — FLUTICASONE PROPIONATE 50 MCG
2 SPRAY, SUSPENSION (ML) NASAL DAILY
Qty: 16 G | Refills: 5 | Status: SHIPPED | OUTPATIENT
Start: 2025-08-04

## 2025-08-04 RX ORDER — GABAPENTIN 400 MG/1
400 CAPSULE ORAL 2 TIMES DAILY
Qty: 180 CAPSULE | Refills: 0 | Status: SHIPPED | OUTPATIENT
Start: 2025-08-22 | End: 2025-09-21

## 2025-08-04 RX ORDER — LOSARTAN POTASSIUM AND HYDROCHLOROTHIAZIDE 12.5; 1 MG/1; MG/1
1 TABLET ORAL DAILY
Qty: 90 TABLET | Refills: 0 | Status: SHIPPED | OUTPATIENT
Start: 2025-08-22

## 2025-08-04 RX ORDER — PANTOPRAZOLE SODIUM 40 MG/1
40 TABLET, DELAYED RELEASE ORAL DAILY
Qty: 90 TABLET | Refills: 1 | Status: SHIPPED | OUTPATIENT
Start: 2025-08-04

## 2025-08-04 RX ORDER — ZOLPIDEM TARTRATE 10 MG/1
10 TABLET ORAL NIGHTLY
Qty: 30 TABLET | Refills: 2 | Status: SHIPPED | OUTPATIENT
Start: 2025-08-04 | End: 2025-10-03

## 2025-08-04 RX ORDER — CELECOXIB 200 MG/1
200 CAPSULE ORAL DAILY
Qty: 90 CAPSULE | Refills: 0 | Status: SHIPPED | OUTPATIENT
Start: 2025-08-04

## 2025-08-04 ASSESSMENT — ENCOUNTER SYMPTOMS
COUGH: 0
WHEEZING: 0
SHORTNESS OF BREATH: 0

## (undated) DEVICE — 1010 S-DRAPE TOWEL DRAPE 10/BX: Brand: STERI-DRAPE™

## (undated) DEVICE — SURGICAL PROCEDURE PACK EYE CLERMONT

## (undated) DEVICE — GAUZE,SPONGE,4"X4",8PLY,STRL,LF,10/TRAY: Brand: MEDLINE

## (undated) DEVICE — Device

## (undated) DEVICE — PREP SOL PVP IODINE 4%  4 OZ/BTL

## (undated) DEVICE — CANNULA NSL 13FT TUBE AD ETCO2 DIV SAMP M

## (undated) DEVICE — NEEDLE HYPO 25GA L1.5IN BLU POLYPR HUB S STL REG BVL STR

## (undated) DEVICE — 6 ML SYRINGE LUER-LOCK TIP: Brand: MONOJECT

## (undated) DEVICE — SOLUTION IV IRRIG WATER 1000ML POUR BRL 2F7114

## (undated) DEVICE — GLOVE ORANGE PI 8   MSG9080